# Patient Record
Sex: FEMALE | Race: BLACK OR AFRICAN AMERICAN | NOT HISPANIC OR LATINO | URBAN - METROPOLITAN AREA
[De-identification: names, ages, dates, MRNs, and addresses within clinical notes are randomized per-mention and may not be internally consistent; named-entity substitution may affect disease eponyms.]

---

## 2018-03-01 PROBLEM — E78.5 HYPERLIPIDEMIA: Status: ACTIVE | Noted: 2018-03-01

## 2018-03-25 PROBLEM — Z00.00 ROUTINE GENERAL MEDICAL EXAMINATION AT A HEALTH CARE FACILITY: Status: ACTIVE | Noted: 2018-03-25

## 2018-03-25 PROBLEM — N18.2 STAGE 2 CHRONIC KIDNEY DISEASE: Status: ACTIVE | Noted: 2018-03-25

## 2018-06-25 PROBLEM — Z00.00 ROUTINE GENERAL MEDICAL EXAMINATION AT A HEALTH CARE FACILITY: Status: RESOLVED | Noted: 2018-03-25 | Resolved: 2018-06-25

## 2018-07-18 PROBLEM — E55.9 VITAMIN D DEFICIENCY DISEASE: Status: ACTIVE | Noted: 2018-07-18

## 2018-07-18 PROBLEM — I10 ESSENTIAL HYPERTENSION, BENIGN: Status: ACTIVE | Noted: 2018-07-18

## 2018-08-05 PROBLEM — E87.6 HYPOKALEMIA: Status: ACTIVE | Noted: 2018-08-05

## 2019-03-13 PROBLEM — R80.8 OTHER PROTEINURIA: Status: ACTIVE | Noted: 2019-03-13

## 2019-07-22 PROBLEM — N95.9 MENOPAUSAL PROBLEM: Status: ACTIVE | Noted: 2019-07-22

## 2019-07-28 PROBLEM — M85.80 OSTEOPENIA: Status: ACTIVE | Noted: 2019-07-28

## 2020-08-03 PROBLEM — Z87.01 HISTORY OF PNEUMONIA: Status: ACTIVE | Noted: 2020-08-03

## 2020-12-03 ENCOUNTER — OFFICE VISIT (OUTPATIENT)
Dept: DERMATOLOGY | Facility: CLINIC | Age: 65
End: 2020-12-03
Payer: COMMERCIAL

## 2020-12-03 DIAGNOSIS — L13.9 BULLOUS DERMATOSIS: Primary | ICD-10-CM

## 2020-12-03 PROCEDURE — 99202 PR OFFICE/OUTPT VISIT, NEW, LEVL II, 15-29 MIN: ICD-10-PCS | Mod: S$GLB,,, | Performed by: STUDENT IN AN ORGANIZED HEALTH CARE EDUCATION/TRAINING PROGRAM

## 2020-12-03 PROCEDURE — 99999 PR PBB SHADOW E&M-EST. PATIENT-LVL III: CPT | Mod: PBBFAC,,, | Performed by: STUDENT IN AN ORGANIZED HEALTH CARE EDUCATION/TRAINING PROGRAM

## 2020-12-03 PROCEDURE — 99202 OFFICE O/P NEW SF 15 MIN: CPT | Mod: S$GLB,,, | Performed by: STUDENT IN AN ORGANIZED HEALTH CARE EDUCATION/TRAINING PROGRAM

## 2020-12-03 PROCEDURE — 99999 PR PBB SHADOW E&M-EST. PATIENT-LVL III: ICD-10-PCS | Mod: PBBFAC,,, | Performed by: STUDENT IN AN ORGANIZED HEALTH CARE EDUCATION/TRAINING PROGRAM

## 2020-12-03 NOTE — LETTER
December 3, 2020      Susanne Gimenez MD  [address] [facility] [address]           [facility]  [address] [facility] [address]  Phone: [phone]  Fax: [phone]          Patient: Aminah Dockery   MR Number: [id number]   YOB: 1955   Date of Visit: 12/3/2020       Dear Dr. Susanne Gimenez:    Thank you for referring Aminah Dockery to me for evaluation. Attached you will find relevant portions of my assessment and plan of care.    If you have questions, please do not hesitate to call me. I look forward to following Aminah Dockery along with you.    Sincerely,    Shahla Katz MD    Enclosure  CC:  No Recipients    If you would like to receive this communication electronically, please contact [email or web address] or [phone] to request more information on [employer] access.    For providers and/or their staff who would like to refer a patient to [facility], please contact us through our one-stop-shop provider referral line, [facility], at [phone].    If you feel you have received this communication in error or would no longer like to receive these types of communications, please e-mail [email or web address]

## 2020-12-03 NOTE — PROGRESS NOTES
Subjective:       Patient ID:  Aminah Dockery is a 65 y.o. female who presents for   Chief Complaint   Patient presents with    blisters     knee and leg, x 3weeks.     History of Present Illness: The patient presents with chief complaint of blisters .  Location: knee and leg   Duration: 3 weeks   Signs/Symptoms: none     Prior treatments: none     Patient endorses taking Aleve and goodies for pain. Denies any new blisters.      Review of Systems   Skin: Negative for itching and rash.   Hematologic/Lymphatic: Does not bruise/bleed easily.        Objective:    Physical Exam   Constitutional: She appears well-developed and well-nourished. No distress.   Neurological: She is alert and oriented to person, place, and time. She is not disoriented.   Psychiatric: She has a normal mood and affect.   Skin:   Areas Examined (abnormalities noted in diagram):   Head / Face Inspection Performed  Neck Inspection Performed  RUE Inspected  LUE Inspection Performed  RLE Inspected  LLE Inspection Performed              Diagram Legend     Erythematous scaling macule/papule c/w actinic keratosis       Vascular papule c/w angioma      Pigmented verrucoid papule/plaque c/w seborrheic keratosis      Yellow umbilicated papule c/w sebaceous hyperplasia      Irregularly shaped tan macule c/w lentigo     1-2 mm smooth white papules consistent with Milia      Movable subcutaneous cyst with punctum c/w epidermal inclusion cyst      Subcutaneous movable cyst c/w pilar cyst      Firm pink to brown papule c/w dermatofibroma      Pedunculated fleshy papule(s) c/w skin tag(s)      Evenly pigmented macule c/w junctional nevus     Mildly variegated pigmented, slightly irregular-bordered macule c/w mildly atypical nevus      Flesh colored to evenly pigmented papule c/w intradermal nevus       Pink pearly papule/plaque c/w basal cell carcinoma      Erythematous hyperkeratotic cursted plaque c/w SCC      Surgical scar with no sign of skin cancer  recurrence      Open and closed comedones      Inflammatory papules and pustules      Verrucoid papule consistent consistent with wart     Erythematous eczematous patches and plaques     Dystrophic onycholytic nail with subungual debris c/w onychomycosis     Umbilicated papule    Erythematous-base heme-crusted tan verrucoid plaque consistent with inflamed seborrheic keratosis     Erythematous Silvery Scaling Plaque c/w Psoriasis     See annotation      Assessment / Plan:        Bullous dermatosis  -     Ddx: bullous drug vs less likely bullous pemphigoid vs bullous arthropod  - No new lesions today  - Patient to call when she develops any new lesions. Will plan to biopsy for H&E and DIF.             Follow up if symptoms worsen or fail to improve.

## 2021-03-09 ENCOUNTER — HOSPITAL ENCOUNTER (EMERGENCY)
Facility: HOSPITAL | Age: 66
Discharge: HOME | End: 2021-03-09
Attending: EMERGENCY MEDICINE
Payer: COMMERCIAL

## 2021-03-09 VITALS
RESPIRATION RATE: 20 BRPM | DIASTOLIC BLOOD PRESSURE: 86 MMHG | HEART RATE: 69 BPM | SYSTOLIC BLOOD PRESSURE: 141 MMHG | WEIGHT: 170.63 LBS | OXYGEN SATURATION: 98 % | TEMPERATURE: 99 F | HEIGHT: 60 IN | BODY MASS INDEX: 33.5 KG/M2

## 2021-03-09 DIAGNOSIS — A60.09 HERPES GENITALIS IN WOMEN: Primary | ICD-10-CM

## 2021-03-09 DIAGNOSIS — R03.0 ELEVATED BLOOD PRESSURE READING: ICD-10-CM

## 2021-03-09 PROCEDURE — 99283 EMERGENCY DEPT VISIT LOW MDM: CPT | Mod: ER

## 2021-03-09 PROCEDURE — 86803 HEPATITIS C AB TEST: CPT | Performed by: EMERGENCY MEDICINE

## 2021-03-09 PROCEDURE — 87529 HSV DNA AMP PROBE: CPT | Performed by: PHYSICIAN ASSISTANT

## 2021-03-09 PROCEDURE — 86703 HIV-1/HIV-2 1 RESULT ANTBDY: CPT | Performed by: EMERGENCY MEDICINE

## 2021-03-09 RX ORDER — NITROFURANTOIN 25; 75 MG/1; MG/1
100 CAPSULE ORAL
COMMUNITY
End: 2022-08-12

## 2021-03-09 RX ORDER — VALACYCLOVIR HYDROCHLORIDE 1 G/1
1000 TABLET, FILM COATED ORAL EVERY 12 HOURS
Qty: 14 TABLET | Refills: 0 | Status: SHIPPED | OUTPATIENT
Start: 2021-03-09 | End: 2021-03-16

## 2021-03-10 LAB
HCV AB SERPL QL IA: NEGATIVE
HIV 1+2 AB+HIV1 P24 AG SERPL QL IA: NEGATIVE

## 2021-03-11 LAB
HSV1 DNA SPEC QL NAA+PROBE: NEGATIVE
HSV2 DNA SPEC QL NAA+PROBE: POSITIVE
SPECIMEN SOURCE: ABNORMAL

## 2021-03-12 ENCOUNTER — TELEPHONE (OUTPATIENT)
Dept: EMERGENCY MEDICINE | Facility: HOSPITAL | Age: 66
End: 2021-03-12

## 2021-03-29 ENCOUNTER — TELEPHONE (OUTPATIENT)
Dept: NEPHROLOGY | Facility: CLINIC | Age: 66
End: 2021-03-29

## 2022-08-12 PROBLEM — R06.2 WHEEZING: Status: ACTIVE | Noted: 2022-08-12

## 2023-04-21 PROBLEM — D50.9 IRON DEFICIENCY ANEMIA: Status: ACTIVE | Noted: 2023-04-21

## 2023-04-21 PROBLEM — E78.00 PURE HYPERCHOLESTEROLEMIA: Status: ACTIVE | Noted: 2023-04-21

## 2023-04-24 PROBLEM — J20.8 ACUTE BRONCHITIS DUE TO OTHER SPECIFIED ORGANISMS: Status: ACTIVE | Noted: 2023-04-24

## 2023-04-24 PROBLEM — J40 BRONCHITIS: Status: ACTIVE | Noted: 2023-04-24

## 2023-04-24 PROBLEM — J20.8 ACUTE BRONCHITIS DUE TO OTHER SPECIFIED ORGANISMS: Status: RESOLVED | Noted: 2023-04-24 | Resolved: 2023-04-24

## 2023-06-08 ENCOUNTER — PATIENT MESSAGE (OUTPATIENT)
Dept: PULMONOLOGY | Facility: CLINIC | Age: 68
End: 2023-06-08
Payer: COMMERCIAL

## 2023-06-08 PROBLEM — J01.00 ACUTE NON-RECURRENT MAXILLARY SINUSITIS: Status: ACTIVE | Noted: 2023-06-08

## 2023-06-12 ENCOUNTER — HOSPITAL ENCOUNTER (OUTPATIENT)
Dept: RADIOLOGY | Facility: HOSPITAL | Age: 68
Discharge: HOME | End: 2023-06-12
Attending: INTERNAL MEDICINE
Payer: COMMERCIAL

## 2023-06-12 ENCOUNTER — OFFICE VISIT (OUTPATIENT)
Dept: PULMONOLOGY | Facility: CLINIC | Age: 68
End: 2023-06-12
Payer: COMMERCIAL

## 2023-06-12 VITALS
WEIGHT: 169.31 LBS | OXYGEN SATURATION: 95 % | RESPIRATION RATE: 18 BRPM | SYSTOLIC BLOOD PRESSURE: 148 MMHG | BODY MASS INDEX: 33.24 KG/M2 | DIASTOLIC BLOOD PRESSURE: 98 MMHG | HEART RATE: 89 BPM | HEIGHT: 60 IN

## 2023-06-12 DIAGNOSIS — I10 ESSENTIAL HYPERTENSION, BENIGN: ICD-10-CM

## 2023-06-12 DIAGNOSIS — J40 BRONCHITIS: ICD-10-CM

## 2023-06-12 DIAGNOSIS — E78.2 MIXED HYPERLIPIDEMIA: ICD-10-CM

## 2023-06-12 DIAGNOSIS — J45.20 MILD INTERMITTENT ASTHMATIC BRONCHITIS WITHOUT COMPLICATION: Primary | ICD-10-CM

## 2023-06-12 DIAGNOSIS — R06.2 WHEEZING: ICD-10-CM

## 2023-06-12 PROBLEM — J45.909 ASTHMATIC BRONCHITIS: Status: ACTIVE | Noted: 2023-04-24

## 2023-06-12 PROCEDURE — 99999 PR PBB SHADOW E&M-EST. PATIENT-LVL V: CPT | Mod: PBBFAC,,, | Performed by: INTERNAL MEDICINE

## 2023-06-12 PROCEDURE — 71046 X-RAY EXAM CHEST 2 VIEWS: CPT | Mod: 26,,, | Performed by: RADIOLOGY

## 2023-06-12 PROCEDURE — 99999 PR PBB SHADOW E&M-EST. PATIENT-LVL V: ICD-10-PCS | Mod: PBBFAC,,, | Performed by: INTERNAL MEDICINE

## 2023-06-12 PROCEDURE — 71046 XR CHEST PA AND LATERAL: ICD-10-PCS | Mod: 26,,, | Performed by: RADIOLOGY

## 2023-06-12 PROCEDURE — 71046 X-RAY EXAM CHEST 2 VIEWS: CPT | Mod: TC,FY,PO

## 2023-06-12 PROCEDURE — 99205 PR OFFICE/OUTPT VISIT, NEW, LEVL V, 60-74 MIN: ICD-10-PCS | Mod: S$GLB,,, | Performed by: INTERNAL MEDICINE

## 2023-06-12 PROCEDURE — 99205 OFFICE O/P NEW HI 60 MIN: CPT | Mod: S$GLB,,, | Performed by: INTERNAL MEDICINE

## 2023-06-12 RX ORDER — IBUPROFEN 800 MG/1
TABLET ORAL
COMMUNITY
Start: 2023-05-17

## 2023-06-12 NOTE — PROGRESS NOTES
Pulmonary Outpatient  Visit     Subjective:       Patient ID: Aminah Dockery is a 68 y.o. female.    Social History     Tobacco Use   Smoking Status Never   Smokeless Tobacco Never            Chief Complaint: Asthma and Wheezing      Aminah Dockery is 68 y.o.  Referred by Susanne Gimenez MD   Hx COVID x 3  Recently Bronchitis: CXR : April 2023  Cough syrup, Abx,   Walk with SOB, [facility] ER, CXR, EKG  Prednisone and Abx  May 12th: went to ER: Steriods given  Felt better  Wheezing improved  PCP gave: prednisone and abx;Levaquin  Robyn fever  Works at [employer]  Has Inhaler, tessalon, Nebulizer with Budesonide.  Brother Had asthma    CXR reviewed: normal            Review of Systems   HENT:  Positive for postnasal drip.    Respiratory:  Positive for cough and wheezing.    All other systems reviewed and are negative.    Outpatient Encounter Medications as of 6/12/2023   Medication Sig Dispense Refill    albuterol (PROVENTIL/VENTOLIN HFA) 90 mcg/actuation inhaler INHALE 2 PUFFS INTO THE LUNGS EVERY 4 HOURS AS NEEDED FOR WHEEZING OR RESCUE 6.7 g 5    amLODIPine (NORVASC) 10 MG tablet Take 1 tablet (10 mg total) by mouth once daily. 30 tablet 5    atorvastatin (LIPITOR) 80 MG tablet Take 1 tablet (80 mg total) by mouth once daily. 30 tablet 5    benzonatate (TESSALON) 200 MG capsule Take 200 capsules by mouth every 8 (eight) hours as needed.      budesonide 1 mg/2 mL NbSp Inhale 1 mg into the lungs once daily. 60 mL 2    chlorhexidine (PERIDEX) 0.12 % solution PLEASE SEE ATTACHED FOR DETAILED DIRECTIONS      cholecalciferol, vitamin D3, 1,250 mcg (50,000 unit) capsule TAKE 1 CAPSULE BY MOUTH ONE TIME PER WEEK 5 capsule 5    doxazosin (CARDURA) 4 MG tablet Take 1 tablet (4 mg total) by mouth every evening. 30 tablet 5    fluconazole (DIFLUCAN) 150 MG Tab Take one tab with onset of yeast ; repeat in 48 hours if stil with sxs 2 tablet 0    hydrocodone-acetaminophen (HYCET)  solution 7.5-325 mg/15mL TAKE 5ML'S BY MOUTH EVERY 6 HOURS AS NEEDED FOR PAIN OR COUGH      ibuprofen (ADVIL,MOTRIN) 800 MG tablet TAKE 1 TABLET BY MOUTH EVERY 6 TO 8 HOURS WITH FOOD AS NEEDED FOR PAIN (MAX 4 TABS/DAY)      levoFLOXacin (LEVAQUIN) 500 MG tablet Take 1 tablet (500 mg total) by mouth once daily. for 10 days 10 tablet 0    lisinopriL (PRINIVIL,ZESTRIL) 40 MG tablet Take 1 tablet (40 mg total) by mouth 2 (two) times daily. 60 tablet 5    metoprolol succinate (TOPROL-XL) 100 MG 24 hr tablet TAKE 1 AND 1/2 TABLETS BY MOUTH ONCE DAILY 45 tablet 5    predniSONE (DELTASONE) 20 MG tablet Take two tabs poqd x 5 days (Patient not taking: Reported on 6/12/2023) 10 tablet 0     No facility-administered encounter medications on file as of 6/12/2023.       The following portions of the patient's history were reviewed and updated as appropriate: She  has a past medical history of Disorder of kidney and ureter, Hyperlipidemia, Hypertension, Routine general medical examination at a health care facility (07/14/2017), and Vitamin D deficiency.  She does not have any pertinent problems on file.  She  has a past surgical history that includes Hysterectomy.  Her family history includes Cancer in her mother; Hypertension in her father; Stroke in her father.  She  reports that she has never smoked. She has never used smokeless tobacco. She reports current alcohol use. She reports that she does not use drugs.  She has a current medication list which includes the following prescription(s): albuterol, amlodipine, atorvastatin, benzonatate, budesonide, chlorhexidine, cholecalciferol (vitamin d3), doxazosin, fluconazole, hydrocodone-apap 7.5-325 mg/15 ml, ibuprofen, levofloxacin, lisinopril, metoprolol succinate, and prednisone.  Current Outpatient Medications on File Prior to Visit   Medication Sig Dispense Refill    albuterol (PROVENTIL/VENTOLIN HFA) 90 mcg/actuation inhaler INHALE 2 PUFFS INTO THE LUNGS EVERY 4 HOURS AS NEEDED  FOR WHEEZING OR RESCUE 6.7 g 5    amLODIPine (NORVASC) 10 MG tablet Take 1 tablet (10 mg total) by mouth once daily. 30 tablet 5    atorvastatin (LIPITOR) 80 MG tablet Take 1 tablet (80 mg total) by mouth once daily. 30 tablet 5    benzonatate (TESSALON) 200 MG capsule Take 200 capsules by mouth every 8 (eight) hours as needed.      budesonide 1 mg/2 mL NbSp Inhale 1 mg into the lungs once daily. 60 mL 2    chlorhexidine (PERIDEX) 0.12 % solution PLEASE SEE ATTACHED FOR DETAILED DIRECTIONS      cholecalciferol, vitamin D3, 1,250 mcg (50,000 unit) capsule TAKE 1 CAPSULE BY MOUTH ONE TIME PER WEEK 5 capsule 5    doxazosin (CARDURA) 4 MG tablet Take 1 tablet (4 mg total) by mouth every evening. 30 tablet 5    fluconazole (DIFLUCAN) 150 MG Tab Take one tab with onset of yeast ; repeat in 48 hours if stil with sxs 2 tablet 0    hydrocodone-acetaminophen (HYCET) solution 7.5-325 mg/15mL TAKE 5ML'S BY MOUTH EVERY 6 HOURS AS NEEDED FOR PAIN OR COUGH      ibuprofen (ADVIL,MOTRIN) 800 MG tablet TAKE 1 TABLET BY MOUTH EVERY 6 TO 8 HOURS WITH FOOD AS NEEDED FOR PAIN (MAX 4 TABS/DAY)      levoFLOXacin (LEVAQUIN) 500 MG tablet Take 1 tablet (500 mg total) by mouth once daily. for 10 days 10 tablet 0    lisinopriL (PRINIVIL,ZESTRIL) 40 MG tablet Take 1 tablet (40 mg total) by mouth 2 (two) times daily. 60 tablet 5    metoprolol succinate (TOPROL-XL) 100 MG 24 hr tablet TAKE 1 AND 1/2 TABLETS BY MOUTH ONCE DAILY 45 tablet 5    predniSONE (DELTASONE) 20 MG tablet Take two tabs poqd x 5 days (Patient not taking: Reported on 6/12/2023) 10 tablet 0     No current facility-administered medications on file prior to visit.     She has No Known Allergies..      BP Readings from Last 3 Encounters:   06/12/23 (!) 148/98   04/24/23 110/80   02/23/23 130/88        MMRC Dyspnea Scale (4 is worst)     [x] MMRC 0: Dyspneic on strenuous excercise (0 points)    [] MMRC 1: Dyspneic on walking a slight hill (0 points)    [] MMRC 2: Dyspneic on  walking level ground; must stop occasionally due to breathlessness (1 point)    [] MMRC 3: Must stop for breathlessness after walking 100 yards or after a few minutes (2 points)    [] MMRC 4: Cannot leave house; breathless on dressing/undressing (3 points)       Asthma Control Test  In the past 4  weeks, how much of the time did your asthma keep you from getting as much done at work, school or at home?: Some of the time  During the past 4 weeks, how often have you had shortness of breath?: Once a day  During the past 4 weeks, how often did your asthma symptoms (wheezing, couging, shortness of breath, chest tightness or pain) wake you up at night or earlier than usual in the morning?: 2 or 3 nights a week  During the past 4 weeks, how often have you used your rescue inhaler or nebulizer medication (such as albuterol)?: 3 or more times per day  How would you rate your asthma control during the past 4 weeks?: Somewhat controlled  If your score is 19 or less, your asthma may not be under control: 11             No flowsheet data found.              Objective:     Vital Signs (Most Recent)  Vital Signs  Pulse: 89  Resp: 18  SpO2: 95 %  BP: (!) 148/98  Height and Weight  Height: 5' (152.4 cm)  Weight: 76.8 kg (169 lb 5 oz)  BSA (Calculated - sq m): 1.8 sq meters  BMI (Calculated): 33.1  Weight in (lb) to have BMI = 25: 127.7]  Wt Readings from Last 2 Encounters:   06/12/23 76.8 kg (169 lb 5 oz)   04/24/23 77.7 kg (171 lb 6.4 oz)       Physical Exam  Vitals and nursing note reviewed.   Constitutional:       Appearance: She is normal weight.   HENT:      Head: Normocephalic and atraumatic.      Nose: Nose normal.   Eyes:      Pupils: Pupils are equal, round, and reactive to light.   Cardiovascular:      Rate and Rhythm: Normal rate and regular rhythm.      Pulses: Normal pulses.      Heart sounds: Normal heart sounds.   Pulmonary:      Effort: Pulmonary effort is normal.      Breath sounds: Normal breath sounds.    Abdominal:      General: Bowel sounds are normal.      Palpations: Abdomen is soft.   Musculoskeletal:      Cervical back: Normal range of motion.   Skin:     General: Skin is warm.   Neurological:      General: No focal deficit present.      Mental Status: She is alert and oriented to person, place, and time.   Psychiatric:         Mood and Affect: Mood normal.        Laboratory  Lab Results   Component Value Date    WBC 7.4 02/23/2023    RBC 4.00 02/23/2023    HGB 11.7 02/23/2023    HCT 35.8 02/23/2023    MCV 90 02/23/2023    MCH 29.3 02/23/2023    MCHC 32.7 02/23/2023    RDW 13.5 02/23/2023     02/23/2023    LYMPH 1.4 02/23/2023    MONO 9 02/23/2023    MONO 0.6 02/23/2023    EOS 0.1 02/23/2023    BASO 0.0 02/23/2023    EOSINOPHIL 2 02/23/2023    BASOPHIL 0 02/23/2023       BMP  Lab Results   Component Value Date     02/23/2023    K 4.1 02/23/2023     02/23/2023    CO2 30 (H) 02/23/2023    BUN 20 02/23/2023    CREATININE 1.58 (H) 02/23/2023    CALCIUM 9.2 02/23/2023    EGFRNONAA 37 (L) 01/06/2022    AST 19 02/23/2023    ALT 15 02/23/2023    PROT 7.2 02/19/2020          No results found for: IGE     No results found for: ASPERGILLUS  No results found for: AFUMIGATUSCL     No results found for: ACE     Diagnostic Results:  I have personally reviewed today the following studies:    X-Ray Chest PA And Lateral  Narrative: EXAM: XR CHEST PA AND LATERAL    CLINICAL HISTORY:  Bronchitis.    TECHNIQUE: 2 views of the chest.    COMPARISON: 05/12/2023 x-ray from outside institution.    FINDINGS:  Heart size is normal.  Mild aortic atherosclerosis is present with elongation of the descending thoracic aorta.  Similar to the previous study.    The lung fields are clear with no infiltrate.  No pleural effusion.  Impression:  Stable negative two-view chest x-ray.  No acute findings.    Finalized on: 6/12/2023 2:49 PM By:  Daniel Booth MD  [facility]# [id number]      2023-06-12 14:51:[id number]    APURVA        Assessment/Plan:     Problem List Items Addressed This Visit       Hyperlipidemia    Essential hypertension, benign    Wheezing    Relevant Orders    X-Ray Chest PA And Lateral (Completed)    Asthmatic bronchitis - Primary    Relevant Orders    Fraction of  Nitric Oxide    Spirometry with/without bronchodilator    IgE        Continue Current therapy  KJ and Budesonide  Adherence with Flonase     Follow up in about 4 weeks (around 7/10/2023), or Labs, CXR today, FeNo, Weed, PCV 13,.    This note was prepared using voice recognition system and is likely to have sound alike errors that may have been overlooked even after proof reading.  Please call me with any questions    Discussed diagnosis, its evaluation, treatment and usual course. All questions answered.    Thank you for the courtesy of participating in the care of this patient    Edwar Mathews MD      Personal Diagnostic Review  [x]  CXR    []  ECHO    []  ONSAT    []  6MWD    []  LABS    []  CHEST CT    []  PET CT    []  Biopsy results

## 2023-07-07 ENCOUNTER — CLINICAL SUPPORT (OUTPATIENT)
Dept: PULMONOLOGY | Facility: CLINIC | Age: 68
End: 2023-07-07
Payer: COMMERCIAL

## 2023-07-07 ENCOUNTER — OFFICE VISIT (OUTPATIENT)
Dept: PULMONOLOGY | Facility: CLINIC | Age: 68
End: 2023-07-07
Payer: COMMERCIAL

## 2023-07-07 VITALS
SYSTOLIC BLOOD PRESSURE: 120 MMHG | HEIGHT: 60 IN | WEIGHT: 175.69 LBS | HEART RATE: 64 BPM | BODY MASS INDEX: 34.49 KG/M2 | BODY MASS INDEX: 34.49 KG/M2 | HEIGHT: 60 IN | DIASTOLIC BLOOD PRESSURE: 82 MMHG | OXYGEN SATURATION: 95 % | RESPIRATION RATE: 17 BRPM | WEIGHT: 175.69 LBS

## 2023-07-07 DIAGNOSIS — J45.50 SEVERE PERSISTENT ASTHMA WITHOUT COMPLICATION: ICD-10-CM

## 2023-07-07 DIAGNOSIS — J45.50 SEVERE PERSISTENT ASTHMA WITHOUT COMPLICATION: Primary | ICD-10-CM

## 2023-07-07 DIAGNOSIS — J45.20 MILD INTERMITTENT ASTHMATIC BRONCHITIS WITHOUT COMPLICATION: ICD-10-CM

## 2023-07-07 DIAGNOSIS — I10 ESSENTIAL HYPERTENSION, BENIGN: ICD-10-CM

## 2023-07-07 DIAGNOSIS — E78.2 MIXED HYPERLIPIDEMIA: ICD-10-CM

## 2023-07-07 LAB
BRPFT: NORMAL
FEF 25 75 CHG: 9.1 %
FEF 25 75 LLN: 0.63
FEF 25 75 POST REF: 37.2 %
FEF 25 75 PRE REF: 34.1 %
FEF 25 75 REF: 1.59
FET100 CHG: 5.3 %
FEV1 CHG: 3.4 %
FEV1 FVC CHG: 2.2 %
FEV1 FVC LLN: 67
FEV1 FVC POST REF: 86.7 %
FEV1 FVC PRE REF: 84.8 %
FEV1 FVC REF: 79
FEV1 LLN: 1.21
FEV1 POST REF: 56.5 %
FEV1 PRE REF: 54.6 %
FEV1 REF: 1.71
FVC CHG: 1.2 %
FVC LLN: 1.55
FVC POST REF: 65 %
FVC PRE REF: 64.2 %
FVC REF: 2.16
PEF CHG: -9.8 %
PEF LLN: 2.68
PEF POST REF: 55 %
PEF PRE REF: 61 %
PEF REF: 4.44
POST FEF 25 75: 0.59 L/S
POST FET 100: 7.8 SEC
POST FEV1 FVC: 68.87 %
POST FEV1: 0.97 L
POST FVC: 1.4 L
POST PEF: 2.44 L/S
PRE FEF 25 75: 0.54 L/S
PRE FET 100: 7.41 SEC
PRE FEV1 FVC: 67.36 %
PRE FEV1: 0.94 L
PRE FVC: 1.39 L
PRE PEF: 2.71 L/S

## 2023-07-07 PROCEDURE — 99214 PR OFFICE/OUTPT VISIT, EST, LEVL IV, 30-39 MIN: ICD-10-PCS | Mod: 25,S$GLB,, | Performed by: INTERNAL MEDICINE

## 2023-07-07 PROCEDURE — 99999 PR PBB SHADOW E&M-EST. PATIENT-LVL V: CPT | Mod: PBBFAC,,, | Performed by: INTERNAL MEDICINE

## 2023-07-07 PROCEDURE — 94618 PULMONARY STRESS TESTING: CPT | Mod: S$GLB,,, | Performed by: INTERNAL MEDICINE

## 2023-07-07 PROCEDURE — 99999 PR PBB SHADOW E&M-EST. PATIENT-LVL I: CPT | Mod: PBBFAC,,,

## 2023-07-07 PROCEDURE — 99999 PR PBB SHADOW E&M-EST. PATIENT-LVL II: ICD-10-PCS | Mod: PBBFAC,,,

## 2023-07-07 PROCEDURE — 99999 PR PBB SHADOW E&M-EST. PATIENT-LVL I: ICD-10-PCS | Mod: PBBFAC,,,

## 2023-07-07 PROCEDURE — 94060 EVALUATION OF WHEEZING: CPT | Mod: S$GLB,,, | Performed by: INTERNAL MEDICINE

## 2023-07-07 PROCEDURE — 94060 PR EVAL OF BRONCHOSPASM: ICD-10-PCS | Mod: S$GLB,,, | Performed by: INTERNAL MEDICINE

## 2023-07-07 PROCEDURE — 99214 OFFICE O/P EST MOD 30 MIN: CPT | Mod: 25,S$GLB,, | Performed by: INTERNAL MEDICINE

## 2023-07-07 PROCEDURE — 99999 PR PBB SHADOW E&M-EST. PATIENT-LVL II: CPT | Mod: PBBFAC,,,

## 2023-07-07 PROCEDURE — 99999 PR PBB SHADOW E&M-EST. PATIENT-LVL V: ICD-10-PCS | Mod: PBBFAC,,, | Performed by: INTERNAL MEDICINE

## 2023-07-07 PROCEDURE — 94618 PULMONARY STRESS TESTING: ICD-10-PCS | Mod: S$GLB,,, | Performed by: INTERNAL MEDICINE

## 2023-07-07 NOTE — PROCEDURES
[facility]-Pulmonary Function 3rdFl  Six Minute Walk   SUMMARY   Ordering Provider: Edwar Mathews MD   Interpreting Provider: Edwar Mathews MD  Performing nurse/tech/RT: VT, RT  Diagnosis:  (Severe persistent asthma without complication)  Height: 5' (152.4 cm)  Weight: 79.7 kg (175 lb 11.3 oz)  BMI (Calculated): 34.3   Patient Race:    Phase Oxygen Assessment Supplemental O2 Heart   Rate Blood Pressure Viky Dyspnea Scale Rating   Resting 96 % Room Air 58 bpm 127/65 0   Exercise        Minute        1 94 % Room Air 76 bpm     2 95 % Room Air 81 bpm     3 95 %   87 bpm     4 95 % Room Air 93 bpm     5 96 % Room Air 94 bpm     6  96 % Room Air 92 bpm 137/72 0   Recovery        Minute        1 98 % Room Air 67 bpm     2 98 % Room Air 68 bpm     3 98 % Room Air 66 bpm     4 98 % Room Air 64 bpm 132/74 0     Six Minute Walk Summary     Patient Reported: Other (Comment) (Knee ache)     Interpretation:  Did the patient stop or pause?: No            Total Time Walked (Calculated): 360 seconds  Final Partial Lap Distance (feet): 125 feet  Total Distance Meters (Calculated): 403.86 meters  Predicted Distance Meters (Calculated): 418.79 meters  Percentage of Predicted (Calculated): 96.43  Peak VO2 (Calculated): 16.1  Mets: 4.6  Has The Patient Had a Previous Six Minute Walk Test?: No       Previous 6MWT Results  Has The Patient Had a Previous Six Minute Walk Test?: No      Six minute walk distance is 403.86m /418.79 meters (96.43 % predicted) with no.Patient did complete the study, walking 360 seconds of the 360 second test . During exercise, there was no  significant desaturation while breathing room air .Lowest oxygen saturation was 94% .Maximum heart rate during exercise was 94 bpm which is 61 % of maximum predicted heart rate of 153 bpm. Blood pressure remained stable and Heart rate remained stable Based upon age and body mass index, exercise capacity is normal.  Peak VO2 during walking was 16.1  ml/kg/min which is 37 % of predicted Peak VO2 max of 43 ml/kg/min based on a resting heart rate of 58/min.    Patient has not had a previous study. No previous study performed.

## 2023-07-07 NOTE — PROCEDURES
Clinical Guide to Interpretation or FeNO Levels :    FeNO  (ppb) LOW INTERMEDIATE HIGH   ADULT VALUES < 25 25-50          > 50   Th2-driven Inflammation Unlikely Likely Significant     Patients FeNO level at this visit : 12 (ppb)    Interpretation of FeNO measurement in adults:  [x] FENO is less than 25 ppb implies non eosinophilic airway inflammation or the absence of airway inflammation.    Comment: Low FENO (<25 ppb) in adult asthmatics with persistent symptoms suggests other etiologies for these symptoms and a lower likelihood of benefit from adding or increasing inhaled glucocorticoids.    [] FENO between 25 and 50 ppb in adults should be interpreted cautiously with reference to the clinical situation (eg, symptomatic, on or off therapy, current smoking).    [] FENO greater than 50 ppb in adults  suggests eosinophilic airway inflammation   Comment: High FENO (>50 ppb) in adult asthmatics even with atypical symptoms suggests glucocorticoid responsiveness. High FENO (>50 ppb) can help identify poor adherence or uncontrolled inflammation in asthma patients with otherwise seemingly controlled asthma.    Discussion:  A FENO less than 25 ppb in adults and less than 20 ppb in children younger than 12 years of age implies noneosinophilic airway inflammation or the absence of airway inflammation.  A FENO greater than 50 ppb in adults or greater than 35 ppb in children suggests eosinophilic airway inflammation.   Values of FENO between 25 and 50 ppb in adults (20 to 35 ppb in children) should be interpreted cautiously with reference to the clinical situation (eg, symptomatic, on or off therapy, current smoking).  A rising FENO with a greater than 20 percent change and more than 25 ppb (20 ppb in children) from a previously stable level suggests increasing eosinophilic airway inflammation, but there are wide inter-individual differences.  A decrease in FENO greater than 20 percent for values over 50 ppb or more than 10  ppb for values less than 50 ppb may be clinically important.  ?FENO in other respiratory diseases - Several other diseases are associated with altered levels of exhaled NO: low levels of FENO have been noted in cystic fibrosis, current smoking, pulmonary hypertension, hypothermia, primary ciliary dyskinesia, and bronchopulmonary dysplasia. Elevated FENO has been noted in atopy, nonasthmatic eosinophilic bronchitis, COPD exacerbations, noncystic fibrosis bronchiectasis, and viral upper respiratory infections.    REFERENCE:  ATS Board of Directors, December 2004, and by the ERS Executive Committee, June 2004. ATS/ERS Recommendations for Standardized Procedures for the Online and Offline Measurement of Exhaled Lower Respiratory Nitric Oxide and Nasal Nitric Oxide. Guideline 2005

## 2023-07-07 NOTE — PROGRESS NOTES
Pulmonary Outpatient  Visit     Subjective:       Patient ID: Aminah Dockery is a 68 y.o. female.    Social History     Tobacco Use   Smoking Status Never   Smokeless Tobacco Never            Chief Complaint: Asthma      Aminah Dockery is 68 y.o.  Referred by Susanne Gimenez MD   Hx COVID x 3  Recently Bronchitis: CXR : April 2023  Cough syrup, Abx,   Walk with SOB, [facility] ER, CXR, EKG  Prednisone and Abx  May 12th: went to ER: Steriods given  Felt better  Wheezing improved  PCP gave: prednisone and abx;Levaquin  Denies fever  Works at [employer]  Has Inhaler, tessalon, Nebulizer with Budesonide.  Brother Had asthma    CXR reviewed: normal    07/7/2023  Followup to review tests  Feels much better  FeNo12  Cough, SOB resolved  However FEV1 54.6%  Added TRELEGY  Reduced FVC and FEV1 raise concern for ILD  Hx COVID and 2nd hand tobacco exposure  6MWD: normal capacity: no desturation            Review of Systems   HENT:  Negative for postnasal drip.    Respiratory:  Negative for snoring and cough.    All other systems reviewed and are negative.    Outpatient Encounter Medications as of 7/7/2023   Medication Sig Dispense Refill    albuterol (PROVENTIL/VENTOLIN HFA) 90 mcg/actuation inhaler INHALE 2 PUFFS INTO THE LUNGS EVERY 4 HOURS AS NEEDED FOR WHEEZING OR RESCUE 6.7 g 5    amLODIPine (NORVASC) 10 MG tablet Take 1 tablet (10 mg total) by mouth once daily. 30 tablet 5    atorvastatin (LIPITOR) 80 MG tablet Take 1 tablet (80 mg total) by mouth once daily. 30 tablet 5    benzonatate (TESSALON) 200 MG capsule Take 200 capsules by mouth every 8 (eight) hours as needed.      budesonide 1 mg/2 mL NbSp Inhale 1 mg into the lungs once daily. 60 mL 2    chlorhexidine (PERIDEX) 0.12 % solution PLEASE SEE ATTACHED FOR DETAILED DIRECTIONS      cholecalciferol, vitamin D3, 1,250 mcg (50,000 unit) capsule TAKE 1 CAPSULE BY MOUTH ONE TIME PER WEEK 5 capsule 5    doxazosin (CARDURA) 4  MG tablet Take 1 tablet (4 mg total) by mouth every evening. 30 tablet 5    fluconazole (DIFLUCAN) 150 MG Tab Take one tab with onset of yeast ; repeat in 48 hours if stil with sxs 2 tablet 0    fluticasone-umeclidin-vilanter (TRELEGY ELLIPTA) 100-62.5-25 mcg DsDv Inhale 1 puff into the lungs once daily. 60 each 11    hydrocodone-acetaminophen (HYCET) solution 7.5-325 mg/15mL TAKE 5ML'S BY MOUTH EVERY 6 HOURS AS NEEDED FOR PAIN OR COUGH      ibuprofen (ADVIL,MOTRIN) 800 MG tablet TAKE 1 TABLET BY MOUTH EVERY 6 TO 8 HOURS WITH FOOD AS NEEDED FOR PAIN (MAX 4 TABS/DAY)      [] levoFLOXacin (LEVAQUIN) 500 MG tablet Take 1 tablet (500 mg total) by mouth once daily. for 10 days 10 tablet 0    lisinopriL (PRINIVIL,ZESTRIL) 40 MG tablet Take 1 tablet (40 mg total) by mouth 2 (two) times daily. 60 tablet 5    metoprolol succinate (TOPROL-XL) 100 MG 24 hr tablet TAKE 1 AND 1/2 TABLETS BY MOUTH ONCE DAILY 45 tablet 5    predniSONE (DELTASONE) 20 MG tablet Take two tabs poqd x 5 days (Patient not taking: Reported on 2023) 10 tablet 0     No facility-administered encounter medications on file as of 2023.       The following portions of the patient's history were reviewed and updated as appropriate: She  has a past medical history of Disorder of kidney and ureter, Hyperlipidemia, Hypertension, Routine general medical examination at a health care facility (2017), and Vitamin D deficiency.  She does not have any pertinent problems on file.  She  has a past surgical history that includes Hysterectomy.  Her family history includes Cancer in her mother; Hypertension in her father; Stroke in her father.  She  reports that she has never smoked. She has never used smokeless tobacco. She reports current alcohol use. She reports that she does not use drugs.  She has a current medication list which includes the following prescription(s): albuterol, amlodipine, atorvastatin, benzonatate, budesonide, chlorhexidine,  cholecalciferol (vitamin d3), doxazosin, fluconazole, fluticasone-umeclidin-vilanter, hydrocodone-apap 7.5-325 mg/15 ml, ibuprofen, lisinopril, metoprolol succinate, and prednisone.  Current Outpatient Medications on File Prior to Visit   Medication Sig Dispense Refill    albuterol (PROVENTIL/VENTOLIN HFA) 90 mcg/actuation inhaler INHALE 2 PUFFS INTO THE LUNGS EVERY 4 HOURS AS NEEDED FOR WHEEZING OR RESCUE 6.7 g 5    amLODIPine (NORVASC) 10 MG tablet Take 1 tablet (10 mg total) by mouth once daily. 30 tablet 5    atorvastatin (LIPITOR) 80 MG tablet Take 1 tablet (80 mg total) by mouth once daily. 30 tablet 5    benzonatate (TESSALON) 200 MG capsule Take 200 capsules by mouth every 8 (eight) hours as needed.      budesonide 1 mg/2 mL NbSp Inhale 1 mg into the lungs once daily. 60 mL 2    chlorhexidine (PERIDEX) 0.12 % solution PLEASE SEE ATTACHED FOR DETAILED DIRECTIONS      cholecalciferol, vitamin D3, 1,250 mcg (50,000 unit) capsule TAKE 1 CAPSULE BY MOUTH ONE TIME PER WEEK 5 capsule 5    doxazosin (CARDURA) 4 MG tablet Take 1 tablet (4 mg total) by mouth every evening. 30 tablet 5    fluconazole (DIFLUCAN) 150 MG Tab Take one tab with onset of yeast ; repeat in 48 hours if stil with sxs 2 tablet 0    hydrocodone-acetaminophen (HYCET) solution 7.5-325 mg/15mL TAKE 5ML'S BY MOUTH EVERY 6 HOURS AS NEEDED FOR PAIN OR COUGH      ibuprofen (ADVIL,MOTRIN) 800 MG tablet TAKE 1 TABLET BY MOUTH EVERY 6 TO 8 HOURS WITH FOOD AS NEEDED FOR PAIN (MAX 4 TABS/DAY)      lisinopriL (PRINIVIL,ZESTRIL) 40 MG tablet Take 1 tablet (40 mg total) by mouth 2 (two) times daily. 60 tablet 5    metoprolol succinate (TOPROL-XL) 100 MG 24 hr tablet TAKE 1 AND 1/2 TABLETS BY MOUTH ONCE DAILY 45 tablet 5    predniSONE (DELTASONE) 20 MG tablet Take two tabs poqd x 5 days (Patient not taking: Reported on 6/12/2023) 10 tablet 0     No current facility-administered medications on file prior to visit.     She has No Known Allergies..      BP Readings  from Last 3 Encounters:   07/07/23 120/82   06/12/23 (!) 148/98   04/24/23 110/80        MMRC Dyspnea Scale (4 is worst)     [x] MMRC 0: Dyspneic on strenuous excercise (0 points)    [] MMRC 1: Dyspneic on walking a slight hill (0 points)    [] MMRC 2: Dyspneic on walking level ground; must stop occasionally due to breathlessness (1 point)    [] MMRC 3: Must stop for breathlessness after walking 100 yards or after a few minutes (2 points)    [] MMRC 4: Cannot leave house; breathless on dressing/undressing (3 points)       Asthma Control Test  In the past 4  weeks, how much of the time did your asthma keep you from getting as much done at work, school or at home?: None of the time  During the past 4 weeks, how often have you had shortness of breath?: Not at all  During the past 4 weeks, how often did your asthma symptoms (wheezing, couging, shortness of breath, chest tightness or pain) wake you up at night or earlier than usual in the morning?: Not at all  During the past 4 weeks, how often have you used your rescue inhaler or nebulizer medication (such as albuterol)?: 1 or 2 times per day  How would you rate your asthma control during the past 4 weeks?: Completely controlled  If your score is 19 or less, your asthma may not be under control: 22             No flowsheet data found.              Objective:     Vital Signs (Most Recent)  Vital Signs  Pulse: 64  Resp: 17  SpO2: 95 %  BP: 120/82  Height and Weight  Height: 5' (152.4 cm)  Weight: 79.7 kg (175 lb 11.3 oz)  BSA (Calculated - sq m): 1.84 sq meters  BMI (Calculated): 34.3  Weight in (lb) to have BMI = 25: 127.7]  Wt Readings from Last 2 Encounters:   07/07/23 79.7 kg (175 lb 11.3 oz)   07/07/23 79.7 kg (175 lb 11.3 oz)       Physical Exam  Vitals and nursing note reviewed.   Constitutional:       Appearance: She is normal weight.   HENT:      Head: Normocephalic and atraumatic.      Nose: Nose normal.   Eyes:      Pupils: Pupils are equal, round, and reactive  to light.   Cardiovascular:      Rate and Rhythm: Normal rate and regular rhythm.      Pulses: Normal pulses.      Heart sounds: Normal heart sounds.   Pulmonary:      Effort: Pulmonary effort is normal.      Breath sounds: Normal breath sounds.   Abdominal:      General: Bowel sounds are normal.      Palpations: Abdomen is soft.   Musculoskeletal:      Cervical back: Normal range of motion.   Skin:     General: Skin is warm.   Neurological:      General: No focal deficit present.      Mental Status: She is alert and oriented to person, place, and time.   Psychiatric:         Mood and Affect: Mood normal.        Laboratory  Lab Results   Component Value Date    WBC 7.4 02/23/2023    RBC 4.00 02/23/2023    HGB 11.7 02/23/2023    HCT 35.8 02/23/2023    MCV 90 02/23/2023    MCH 29.3 02/23/2023    MCHC 32.7 02/23/2023    RDW 13.5 02/23/2023     02/23/2023    LYMPH 1.4 02/23/2023    MONO 9 02/23/2023    MONO 0.6 02/23/2023    EOS 0.1 02/23/2023    BASO 0.0 02/23/2023    EOSINOPHIL 2 02/23/2023    BASOPHIL 0 02/23/2023       BMP  Lab Results   Component Value Date     02/23/2023    K 4.1 02/23/2023     02/23/2023    CO2 30 (H) 02/23/2023    BUN 20 02/23/2023    CREATININE 1.58 (H) 02/23/2023    CALCIUM 9.2 02/23/2023    EGFRNONAA 37 (L) 01/06/2022    AST 19 02/23/2023    ALT 15 02/23/2023    PROT 7.2 02/19/2020          Lab Results   Component Value Date    IGE 1142 (H) 06/12/2023        No results found for: ASPERGILLUS  No results found for: AFUMIGATUSCL     No results found for: ACE     Diagnostic Results:  I have personally reviewed today the following studies:    X-Ray Chest PA And Lateral  Narrative: EXAM: XR CHEST PA AND LATERAL    CLINICAL HISTORY:  Bronchitis.    TECHNIQUE: 2 views of the chest.    COMPARISON: 05/12/2023 x-ray from outside institution.    FINDINGS:  Heart size is normal.  Mild aortic atherosclerosis is present with elongation of the descending thoracic aorta.  Similar to the  previous study.    The lung fields are clear with no infiltrate.  No pleural effusion.  Impression:  Stable negative two-view chest x-ray.  No acute findings.    Finalized on: 6/12/2023 2:49 PM By:  Daniel Booth MD  [facility]# [id number]      2023-06-12 14:51:[id number]    [facility]     FeNo 12    FEV1: 0.94L( 54.6%), FVC 1.39L( 64.2%)  FEV1/FVC 67    Ordering Provider: Edwar Mathews MD              Interpreting Provider: Edwar Mathews MD  Performing nurse/tech/RT: VT, RT  Diagnosis:  (Severe persistent asthma without complication)  Height: 5' (152.4 cm)  Weight: 79.7 kg (175 lb 11.3 oz)  BMI (Calculated): 34.3              Patient Race:    Phase Oxygen Assessment Supplemental O2 Heart   Rate Blood Pressure Viky Dyspnea Scale Rating   Resting 96 % Room Air 58 bpm 127/65 0   Exercise             Minute             1 94 % Room Air 76 bpm       2 95 % Room Air 81 bpm       3 95 %  87 bpm       4 95 % Room Air 93 bpm       5 96 % Room Air 94 bpm       6  96 % Room Air 92 bpm 137/72 0   Recovery             Minute             1 98 % Room Air 67 bpm       2 98 % Room Air 68 bpm       3 98 % Room Air 66 bpm       4 98 % Room Air 64 bpm 132/74 0      Six Minute Walk Summary  Patient Reported: Other (Comment) (Knee ache)          Interpretation:  Did the patient stop or pause?: No  Total Time Walked (Calculated): 360 seconds  Final Partial Lap Distance (feet): 125 feet  Total Distance Meters (Calculated): 403.86 meters  Predicted Distance Meters (Calculated): 418.79 meters  Percentage of Predicted (Calculated): 96.43  Peak VO2 (Calculated): 16.1  Mets: 4.6  Has The Patient Had a Previous Six Minute Walk Test?: No     Previous 6MWT Results  Has The Patient Had a Previous Six Minute Walk Test?: No       Assessment/Plan:     Problem List Items Addressed This Visit       Hyperlipidemia     On LIPITOR         Essential hypertension, benign     On NORVASC         Severe asthma - Primary    Relevant Medications     fluticasone-umeclidin-vilanter (TRELEGY ELLIPTA) 100-62.5-25 mcg DsDv    Other Relevant Orders    CT Chest Without Contrast    Stress test, pulmonary (Completed)    PFT - related Arterial Blood Gas    EKG 12-lead    Echo    Ambulatory referral/consult to Pulmonary Disease Management w/ Respiratory Therapist      FEV1 appears decreased out of proportion  Continue Current therapy   TRELEGY added       Follow up in about 3 months (around 10/7/2023), or chest ct, 6MWD, ABG, EKG, echo.    This note was prepared using voice recognition system and is likely to have sound alike errors that may have been overlooked even after proof reading.  Please call me with any questions    Discussed diagnosis, its evaluation, treatment and usual course. All questions answered.    Thank you for the courtesy of participating in the care of this patient    Edwar Mathews MD      Personal Diagnostic Review  [x]  CXR    []  ECHO    []  ONSAT    []  6MWD    []  LABS    []  CHEST CT    []  PET CT    []  Biopsy results     EVER Index for COPD Survival Prediction   Select Criteria:   FEV1 % Predicted After Bronchodialator     [] >= 65% (0 points)    [x] 50-64% (1 point)    [] 36-49% (2 points)    [] <= 35% (3 points)   6 Minute Walk Distance     [x] >= 350 Meters (0 points)    [] 250-349 Meters (1 point)    [] 150-249 Meters (2 points)    [] <= 149 Meters (3 points)   MMRC Dyspnea Scale (4 is worst)     [x] MMRC 0: Dyspneic on strenuous excercise (0 points)    [] MMRC 1: Dyspneic on walking a slight hill (0 points)    [] MMRC 2: Dyspneic on walking level ground; must stop occasionally due to breathlessness (1 point)    [] MMRC 3: Must stop for breathlessness after walking 100 yards or after a few minutes (2 points)    [] MMRC 4: Cannot leave house; breathless on dressing/undressing (3 points)   Body Mass Index     [x] > 21 (0 points)    [] <= 21 (1 point)   Results: Total Criteria Point Count:     Approximate 4 Year Survival Interpretation    0-2 Points:  [x] 80%   3-4 Points:  [] 67%   5-6 Points:  [] 57%   7-10 Points:[] 18%         References  Chrissie BR, Sammie CG, Gabe ELAM, et. al. The body-mass index, airflow obstruction, dyspnea and exercise capacity index in chronic obstructive pulmonary disease. N Engl J Med. 2004 Mar 4;350(10):1005-12.  Sergio DA, Wells CK. Evaluation of clinical methods for rating dyspnea. Chest. 1988 Mar;93(3):580-6

## 2023-07-10 ENCOUNTER — HOSPITAL ENCOUNTER (OUTPATIENT)
Dept: CARDIOLOGY | Facility: HOSPITAL | Age: 68
Discharge: HOME | End: 2023-07-10
Attending: INTERNAL MEDICINE
Payer: COMMERCIAL

## 2023-07-10 DIAGNOSIS — J45.50 SEVERE PERSISTENT ASTHMA WITHOUT COMPLICATION: ICD-10-CM

## 2023-07-10 PROCEDURE — 93005 ELECTROCARDIOGRAM TRACING: CPT

## 2023-07-10 PROCEDURE — 93010 ELECTROCARDIOGRAM REPORT: CPT | Mod: ,,, | Performed by: INTERNAL MEDICINE

## 2023-07-10 PROCEDURE — 93010 EKG 12-LEAD: ICD-10-PCS | Mod: ,,, | Performed by: INTERNAL MEDICINE

## 2023-07-13 ENCOUNTER — TELEPHONE (OUTPATIENT)
Dept: PULMONOLOGY | Facility: CLINIC | Age: 68
End: 2023-07-13
Payer: COMMERCIAL

## 2023-07-13 DIAGNOSIS — J45.50 SEVERE PERSISTENT ASTHMA WITHOUT COMPLICATION: Primary | ICD-10-CM

## 2023-07-13 RX ORDER — FLUTICASONE PROPIONATE AND SALMETEROL 100; 50 UG/1; UG/1
1 POWDER RESPIRATORY (INHALATION) 2 TIMES DAILY
Qty: 720 EACH | Refills: 0 | Status: SHIPPED | OUTPATIENT
Start: 2023-07-13 | End: 2024-07-12

## 2023-07-13 NOTE — TELEPHONE ENCOUNTER
Chronic Disease Management:  Contacted [employer] for details on medication coverage.   Patient was prescribed Trelegy for respiratory maintenance therapy but cannot afford the medication.    30 day supply co-pays are as follows:  Trelegy- $133.00   Breo- $78.00  Spiriva- $101.00  Breztri- $124.00   Advair Diskus- $0    Message sent to pulmonary provider to advise.

## 2023-07-14 ENCOUNTER — CLINICAL SUPPORT (OUTPATIENT)
Dept: PULMONOLOGY | Facility: CLINIC | Age: 68
End: 2023-07-14
Payer: COMMERCIAL

## 2023-07-14 VITALS
HEART RATE: 60 BPM | RESPIRATION RATE: 16 BRPM | BODY MASS INDEX: 34.66 KG/M2 | OXYGEN SATURATION: 98 % | WEIGHT: 176.56 LBS | HEIGHT: 60 IN

## 2023-07-14 DIAGNOSIS — J45.50 SEVERE PERSISTENT ASTHMA WITHOUT COMPLICATION: ICD-10-CM

## 2023-07-14 PROCEDURE — 99999 PR PBB SHADOW E&M-EST. PATIENT-LVL IV: CPT | Mod: PBBFAC,,,

## 2023-07-14 PROCEDURE — 99999 PR PBB SHADOW E&M-EST. PATIENT-LVL IV: ICD-10-PCS | Mod: PBBFAC,,,

## 2023-07-14 NOTE — PATIENT INSTRUCTIONS
Understanding Asthma         Asthma is a long-term (chronic) lung condition. It involves the airways (bronchial tubes). It happens when a trigger causes your airways to swell and become narrow. The muscles around your airways start to tighten. When your airways start to narrow, air can't move in and out of your lungs very well. Mucus also builds up along the airways. This makes it even harder to move air in and out of your lungs.  Experts are not exactly sure what causes asthma. It may be caused by a mix of inherited and environmental factors. People with asthma may have no symptoms until they are exposed to an allergen or trigger.  Healthy lungs  Inside your lungs there are branching airways made of stretchy tissue. Each airway is wrapped with bands of muscle. The airways are smaller as they go deeper into the lungs. The smallest airways end in clusters of tiny balloon-like air sacs (alveoli). These clusters are surrounded by blood vessels. When you breathe in (inhale), air enters the lungs. It travels down through the airways until it reaches the air sacs. When you breathe out (exhale), air travels up through the airways and out of the lungs. The airways make mucus that traps particles you breathe in. Normally, the mucus is then swept out of the lungs by tiny hairs (cilia) that line the airways. The mucus is swallowed or coughed up during your day.    What the lungs do  The air you inhale contains oxygen. When oxygen reaches the air sacs, it passes into the blood vessels around the sacs. Your blood then sends oxygen to all of your cells. As you exhale, carbon dioxide is removed in a similar way from the blood in the air sacs, and from your body.    When you have asthma  People with asthma have very sensitive airways. This means the airways react to certain things called triggers. Triggers can include pollen, dust, or smoke. Triggers cause inflammation. This makes the airways swell and become narrow. This is a  long-lasting (chronic) problem. Your airways may not always be narrow enough so that you notice breathing problems.  Symptoms of chronic inflammation include:   Coughing (chronic)   A feeling of tightness in your chest   Feeling short of breath   Wheezing (a whistling noise, especially when breathing out)   Low energy or feeling tired   In some people, over time chronic mild inflammation can lead to lasting (permanent) scarring of airways and loss of lung function.    Asthma flare-ups  When sensitive airways are irritated by a trigger, the muscles around the airways tighten. The lining of the airways swells. Thick, sticky mucus increases and partly clogs the airways. All of this makes it harder to breathe.  Symptoms of flare-ups may include:  Coughing, especially at night. You may not be able to sleep because of coughing.   Getting tired or out of breath easily   Wheezing   Chest tightness   Faster breathing when at rest   Flare-ups can be life-threatening. In a severe flare-up, the muscle tightening, swelling, and mucus are worse. Its very hard to breathe. Your body can't get enough oxygen and can't remove carbon dioxide. Waste gas is trapped in the alveoli. Gas exchange cant occur. The body is not getting enough oxygen. Without oxygen, body tissues, especially brain tissue, begin to get damaged. If this goes on for long, it can lead to severe brain damage or death.  Call 911 (or have someone call for you) if you have any of these symptoms and they are not relieved right away by taking your quick-relief medicine as prescribed:  Trouble breathing   Feeling too short of breath to talk or walk   Lips or fingers turning blue   Feeling lightheaded or dizzy, as though you are about to pass out   Peak flow less than 50% of your personal best, if you use a peak flow meter     Managing your asthma  Asthma is a long-term condition. So its important to work with your healthcare provider to manage it. If you have asthma,  you can prevent flare-ups. Develop an asthma action plan with your healthcare provider. It can help control your asthma and manage your symptoms. An asthma action plan also tells you and your family or friends what to do if your asthma flares up or gets worse.  Take your medicine as prescribed. Also learn about your asthma triggers. Knowing what causes your asthma to flare up in the first place can help you prevent future breathing problems.  If you smoke, get help to quit.   Asthma Trigger Checklist  Allergens, irritants, and other things may trigger your asthma. Check the box next to each of your triggers. After each trigger is a list of ways to avoid it.   Dust mites. Dust mites live in mattresses, bedding, carpets, curtains, and indoor dust.  To kill dust mites, wash bedding in hot water (130°F) each week.  Cover mattress and pillows with special dust-mite-proof cases.  Don't use upholstered furniture like sofas or chairs in the bedroom.  Use allergy-proof filters for air conditioners and furnaces. Replace or clean them as instructed.  If you can, replace carpeting with wood or tile fanta, especially in the bedroom.  Animals. Animals with fur or feathers shed dander (allergens).  It's best to choose a pet that doesn't have fur or feathers, such as a fish or a reptile.  If you have pets, keep them off your bed and out of your bedroom.  Wash your hands and clothes after handling pets.    Mold. Mold grows in damp places, such as bathrooms, basements, and closets.  Ask someone to clean damp areas in your home every week. Or try wearing a face mask while you clean.  Run an exhaust fan while bathing. Or leave a window open in the bathroom.  Repair water leaks in or around your home.  Have someone else cut grass or rake leaves, if possible.  Don't use vaporizers or humidifiers. They encourage mold growth.    Pollen. Pollen from trees, grasses, and weeds is a common allergen. (Flower pollens are generally not a  problem).  Try to learn what types of pollen affect you most. Pollen levels vary depending on the plant, the season, and the time of day.  If possible, use air conditioning instead of opening the windows in your home or car.  Have someone else do yard work, if possible.    Cockroaches. Roaches are found in many homes and produce allergens.  Keep your kitchen clean and dry. A leaky faucet or drain can attract roaches.  Remove garbage from your home daily.  Store food in tightly sealed containers. Wash dishes as soon as they are used.  Use bait stations or traps to control roaches. Avoid using chemical sprays.    Smoke. Smoke may be from cigarettes, cigars, pipes, incense or candles, barbecues or grills, and fireplaces.  Don't smoke. And don't let people smoke in your home or car.  When you travel, ask for nonsmoking rental cars and hotel rooms.  Avoid fireplaces and wood stoves. If you can't, sit away from them. Make sure the smoke is directed outside.  Don't burn incense or use candles.  Move away from smoky outdoor cooking grills.    Smog.  Smog is from car exhaust and other pollution.  Read or listen to local air-quality reports. These let you know when air quality is poor.  Stay indoors as much as you can on smoggy days. If possible, use air conditioning instead of opening the windows.  In your car, set air conditioning to recirculate air, so less pollution gets in.    Strong odors. These include air fresheners, deodorizers, and cleaning products; perfume, deodorant, and other beauty products; incense and candles; and insect sprays and other sprays.  Use scent-free products like deodorant or body lotion.  Avoid using cleaning products with bleach and ammonia. Make your own cleaning solution with white vinegar, baking soda, or mild dish soap.  Use exhaust fans while cooking. Or open a window, if possible.   Avoid perfumes, air fresheners, potpourri, and other scented products.          Other irritants. These  include dust, aerosol sprays, and powders.  Wear a face mask while doing tasks like sanding, dusting, sweeping, and yard work. Open doors and windows if working indoors.  Use pump spray bottles instead of aerosols.  Pour liquid  onto a rag or cloth instead of spraying them.    Weather. Weather conditions can trigger symptoms or make them worse.  Watch for very high or low temperatures, very humid conditions, or a lot of wind, as these conditions can make symptoms worse.  Limit outdoor activity during the type of weather that affects you.  Wear a scarf over your mouth and nose in cold weather.    Colds, flu, and sinus infections. Upper respiratory infections can trigger asthma.  Wash your hands often with soap and warm water or use a hand  containing alcohol.  Get a yearly flu shot. And ask if you should get a pneumonia vaccine.  Take care of your general health. Get plenty of sleep. And eat a variety of healthy foods.    Food additives. Food additives can trigger asthma flare-ups in some people.  Check food labels for sulfites or other similar ingredients. These are often found in foods such as wine, beer, and dried fruits.  Avoid foods that contain these additives.    Medicine. Aspirin, NSAIDS like ibuprofen and naproxen, and heart medicines like beta-blockers may be triggers.  Tell your health care provider if you think certain medicines trigger symptoms.   Be sure to read the labels on over-the-counter medicines. They may have ingredients that cause symptoms for you.   , Emotions. Laughing, crying, or feeling excited are triggers for some people.   To help you stay calm: Try breathing in slowly through your nose for a count of 2 seconds. Close your lips and breathe out for 4 seconds. Repeat.  Try to focus on a soothing image in your mind. This will help relax you and calm your breathing.  Remember to take your daily controller medicines. When you're upset or under stress, it's easy to forget.     Exercise. For some people, exercise can trigger symptoms. Don't let this keep you from being active.   If you have not been exercising regularly, start slow and work up gradually.  Take all of your medicines as prescribed.  If you use quick-relief medicine, make sure you have it with you when you exercise.  Stop if you have any symptoms. Make sure you talk with your provider about these symptoms.  © 2866-7381 [employer]. [address] [place] [id number]. All rights reserved. This information is not intended as a substitute for professional medical care. Always follow your healthcare professional's instructions.             Using Dry-Powder Inhalers (DPIs)  Some asthma medications are inhaled using inhalers. Dry-powder inhalers (DPIs) dispense measured doses of powdered medicine into your lungs. DPIs often have counters to track the number of doses used. Keep in mind that DPIs don't all work the same way. So be sure you know how to use yours properly.  Using a DPI  Load the prescribed dose of medicine by following the instructions that came with the inhaler.  Breathe out normally, holding the inhaler away from your mouth. Hold your chin up.  Put the mouthpiece between your lips. Breathe in deeply through the inhaler--not through your nose. You may not feel or taste the medicine as you breathe in. This is normal.  Take the mouthpiece out of your mouth. Hold your breath for a count of 10, or as long as you can comfortably.  Breathe out slowly--but do not breathe out through the inhaler. Moisture from your breath can make the powder stick inside the inhaler.  Be sure to close the inhaler and store it in a dry place.  Rinse your mouth with water and spit it out, don't swallow it.  Do not wash your DPI with soap and water. To clean the mouthpiece, wipe with a dry cloth as needed.    © 3286-8616 [employer]. [address] [place] [address], [place] [address]. All rights reserved. This  information is not intended as a substitute for professional medical care. Always follow your healthcare professional's instructions.            Using an Inhaler with a Spacer  To control asthma, you need to use your medicines the right way. Some medicines are inhaled using a device called a metered-dose inhaler (MDI). Metered-dose inhalers deliver medicine with a fine spray. You may be asked to use a spacer (holding tube) with your inhaler. The spacer helps make sure all the medicine you need goes into your lungs.   Steps for using an inhaler with a spacer  Step 1:  Remove the caps from the inhaler and spacer.  Shake the inhaler well and attach the spacer. If the inhaler is being used for the first time or has not been used for a while, prime it as directed by the product maker.  Step 2:  Breathe out normally.  Put the spacer between your teeth. Close your lips tightly around it.  Keep your chin up.  Step 3:  Spray 1 puff into the spacer by pressing down on the inhaler.  Then breathe in through your mouth as slowly and deeply as you can. This should take about 5-10 seconds. If you breathe too quickly, you may hear a whistling sound in certain spacers.  Step 4:  Take the spacer out of your mouth.  Hold your breath for a count of 10.  Then hold your lips together and slowly breathe out through your mouth ACTION PLAN    GREEN ZONE  My sputum is clear/white/usual color and easily cleared.  My breathing is no harder than usual.  I can do my usual activities.  I can think clearly.   Take your usual medicines, including oxygen, as you are told to do so by your health care provider.   YELLOW ZONE  My sputum has change (color, thickness, amount).  I am more short of breath than usual.  I cough or wheeze more.  I weigh more and my legs/feet swell.  I cannot do my usual activities without resting.   Call your health care provider. You will probably be told to begin taking an antibiotic and prednisone. Have your pharmacy phone  number available.   RED ZONE  I cannot cough out my sputum.  I am much more short of breath than normal.  I need to sit up to breathe  I cannot do my usual activities.  I am unable to speak more than one or two words at a time.  I am confused.   Call your health care provider. You may be asked to come in to be seen, told to go to the emergency room, or told to call 9-1-1.

## 2023-07-14 NOTE — PROGRESS NOTES
Pulmonary Disease Management  [facility]  Initial Visit    Referring Provider: MD Reid  Diagnosis: Severe asthma   Last Hospital Admission: 3/9/21  Last provider visit: 7/7/23      Current Outpatient Medications:     albuterol (PROVENTIL/VENTOLIN HFA) 90 mcg/actuation inhaler, INHALE 2 PUFFS INTO THE LUNGS EVERY 4 HOURS AS NEEDED FOR WHEEZING OR RESCUE, Disp: 6.7 g, Rfl: 5    amLODIPine (NORVASC) 10 MG tablet, Take 1 tablet (10 mg total) by mouth once daily., Disp: 30 tablet, Rfl: 5    atorvastatin (LIPITOR) 80 MG tablet, Take 1 tablet (80 mg total) by mouth once daily., Disp: 30 tablet, Rfl: 5    benzonatate (TESSALON) 200 MG capsule, Take 200 capsules by mouth every 8 (eight) hours as needed., Disp: , Rfl:     chlorhexidine (PERIDEX) 0.12 % solution, PLEASE SEE ATTACHED FOR DETAILED DIRECTIONS, Disp: , Rfl:     cholecalciferol, vitamin D3, 1,250 mcg (50,000 unit) capsule, TAKE 1 CAPSULE BY MOUTH ONE TIME PER WEEK, Disp: 5 capsule, Rfl: 5    doxazosin (CARDURA) 4 MG tablet, Take 1 tablet (4 mg total) by mouth every evening., Disp: 30 tablet, Rfl: 5    fluconazole (DIFLUCAN) 150 MG Tab, Take one tab with onset of yeast ; repeat in 48 hours if stil with sxs, Disp: 2 tablet, Rfl: 0    fluticasone-salmeterol diskus inhaler 100-50 mcg, Inhale 1 puff into the lungs 2 (two) times daily. Controller, Disp: 720 each, Rfl: 0    hydrocodone-acetaminophen (HYCET) solution 7.5-325 mg/15mL, TAKE 5ML'S BY MOUTH EVERY 6 HOURS AS NEEDED FOR PAIN OR COUGH, Disp: , Rfl:     ibuprofen (ADVIL,MOTRIN) 800 MG tablet, TAKE 1 TABLET BY MOUTH EVERY 6 TO 8 HOURS WITH FOOD AS NEEDED FOR PAIN (MAX 4 TABS/DAY), Disp: , Rfl:     lisinopriL (PRINIVIL,ZESTRIL) 40 MG tablet, Take 1 tablet (40 mg total) by mouth 2 (two) times daily., Disp: 60 tablet, Rfl: 5    metoprolol succinate (TOPROL-XL) 100 MG 24 hr tablet, TAKE 1 AND 1/2 TABLETS BY MOUTH ONCE DAILY, Disp: 45 tablet, Rfl: 5    predniSONE (DELTASONE) 20 MG tablet,  Take two tabs poqd x 5 days (Patient not taking: Reported on 6/12/2023), Disp: 10 tablet, Rfl: 0    Review of patient's allergies indicates:  No Known Allergies    Smoking history:   Social History     Tobacco Use   Smoking Status Never   Smokeless Tobacco Never       Pulse: 60  SpO2: 98 %  Resp: 16  Height: 5' (152.4 cm)  Weight: 80.1 kg (176 lb 9.4 oz)  BMI (kg/m2): 34.56  Current Oxygen Use: No  DME Provider: [facility]   Does the patient use BiPAP, CPAP or Trilogy?: No     ACT Questionnaire:  Asthma Control Test  In the past 4  weeks, how much of the time did your asthma keep you from getting as much done at work, school or at home?: None of the time  During the past 4 weeks, how often have you had shortness of breath?: Once or twice a week  During the past 4 weeks, how often did your asthma symptoms (wheezing, couging, shortness of breath, chest tightness or pain) wake you up at night or earlier than usual in the morning?: Not at all  During the past 4 weeks, how often have you used your rescue inhaler or nebulizer medication (such as albuterol)?: 1 or 2 times per day  How would you rate your asthma control during the past 4 weeks?: Well controlled  If your score is 19 or less, your asthma may not be under control: 20  Has this patient had any pulmonary studies (PFTS)?: Yes  PFT Results:  Pre FVC   Date/Time Value Ref Range Status   07/07/2023 10:07 AM 1.39 L Final     Pre FEV1   Date/Time Value Ref Range Status   07/07/2023 10:07 AM 0.94 L Final     Pre FEV1 FVC   Date/Time Value Ref Range Status   07/07/2023 10:07 AM 67.36 % Final     Is this patient a candidate for pulmonary rehab?: No  Method Used for Education: Literature, Demonstration, Verbal  Did the patient demonstrate understanding?: Yes  What tests has the patient had done today?: Spirometry, Six Minute Walk      Educational assessment:   [x]            Good  []            Fair  []            Poor    Readiness to learn:   [x]            Good  []             Fair  []            Poor    Vision Status:   [x]            Good  []            Fair  []            Poor    Reading Ability:  [x]            Good  []            Fair  []            Poor    Knowledge of condition:   [x]            Good  []            Fair  []            Poor    Language Barriers:   []            Good  []            Fair  []            Poor  [x]            None    Cognitive/ Physical Barriers:   []            Good  []            Fair  []            Poor  [x]            None    Learning best by:                       [x]            Seeing  []            Hearing  []            Reading                         [x]            Doing    Describe any barrier /Limitation or financial implications of care choices identified     []            Financial  []            Emotional  []            Education  []            Vision/Hearing  []            Physical  [x]            None  []                TOPIC /CONTENT FOR TODAY:    [x]            MDI with or without spacer  [x]            Dry powder inhaler  []            Acapella   []           Peak Flow meter  [x]            Action plan  [x]            Nebulizer use  []            Oxygen use safety  [x]            Breathing and cough techniques  [x]            Energy conservation  [x]            Infection prevention  []           OTHER________________________        Learner:    [x]            Patient   []            Caregiver    Method:    [x]            Verbal explanation  [x]            Audio visual    [x]            Literature  [x]            Teach back      Evaluation:    [x]            Teach back  [x]            Demonstrate  [x]            Follow up phone call    []            2 weeks     [x]            4 weeks   [x]            PRN      Therapist Comments:   Patient was seen today to review respiratory medication purpose and proper technique for use of inhalers. Patient practiced proper technique using MDI with valved holding chamber (spacer) and DPI inhalers.  Patient demonstrated understanding. Literature was given to patient.     Asthma trigger checklist was verbally reviewed and literature given to patient.     Infection prevention was discussed. Patient was reminded to get influenza vaccine. Hand hygiene and cleaning of respiratory equipment was also discussed. Patient verbalized understanding.      Asthma action plan was reviewed and literature was given to patient. Patient verbalized understanding.      Plan:  Monthly Pulmonary Disease Management Questionnaire  Follow-up PDM appointment scheduled for 1/23/24  Reinforce education  Meds: Advair Diskus, ventolin HFA   DME Needs: [facility]   Action Plan: Asthma   Immunization: Pneumococcal- current, Flu-current, Covid- current   Next Provider Visit: 10/20/23  Next Spirometry/CPFT: not scheduled   Approximate time spent with patient: 45 mins

## 2023-09-11 PROBLEM — J01.00 ACUTE NON-RECURRENT MAXILLARY SINUSITIS: Status: RESOLVED | Noted: 2023-06-08 | Resolved: 2023-09-11

## 2023-10-20 ENCOUNTER — HOSPITAL ENCOUNTER (OUTPATIENT)
Dept: RADIOLOGY | Facility: HOSPITAL | Age: 68
Discharge: HOME | End: 2023-10-20
Attending: INTERNAL MEDICINE
Payer: COMMERCIAL

## 2023-10-20 ENCOUNTER — HOSPITAL ENCOUNTER (OUTPATIENT)
Dept: CARDIOLOGY | Facility: HOSPITAL | Age: 68
Discharge: HOME | End: 2023-10-20
Attending: INTERNAL MEDICINE
Payer: COMMERCIAL

## 2023-10-20 ENCOUNTER — CLINICAL SUPPORT (OUTPATIENT)
Dept: PULMONOLOGY | Facility: CLINIC | Age: 68
End: 2023-10-20
Attending: INTERNAL MEDICINE
Payer: COMMERCIAL

## 2023-10-20 VITALS
WEIGHT: 175.25 LBS | SYSTOLIC BLOOD PRESSURE: 132 MMHG | OXYGEN SATURATION: 95 % | HEART RATE: 60 BPM | HEIGHT: 60 IN | HEIGHT: 60 IN | BODY MASS INDEX: 33.96 KG/M2 | BODY MASS INDEX: 34.41 KG/M2 | SYSTOLIC BLOOD PRESSURE: 126 MMHG | DIASTOLIC BLOOD PRESSURE: 78 MMHG | WEIGHT: 173 LBS | DIASTOLIC BLOOD PRESSURE: 86 MMHG | RESPIRATION RATE: 16 BRPM

## 2023-10-20 DIAGNOSIS — J45.50 SEVERE PERSISTENT ASTHMA WITHOUT COMPLICATION: ICD-10-CM

## 2023-10-20 DIAGNOSIS — J45.50 SEVERE PERSISTENT ASTHMA WITHOUT COMPLICATION: Primary | ICD-10-CM

## 2023-10-20 DIAGNOSIS — E78.2 MIXED HYPERLIPIDEMIA: ICD-10-CM

## 2023-10-20 DIAGNOSIS — I10 ESSENTIAL HYPERTENSION, BENIGN: ICD-10-CM

## 2023-10-20 PROBLEM — R06.2 WHEEZING: Status: RESOLVED | Noted: 2022-08-12 | Resolved: 2023-10-20

## 2023-10-20 LAB
ALLENS TEST: ABNORMAL
AORTIC ROOT ANNULUS: 2.72 CM
ASCENDING AORTA: 3.08 CM
AV INDEX (PROSTH): 0.73
AV MEAN GRADIENT: 5 MMHG
AV PEAK GRADIENT: 9 MMHG
AV VALVE AREA BY VELOCITY RATIO: 2.37 CM²
AV VALVE AREA: 2.28 CM²
AV VELOCITY RATIO: 0.76
BSA FOR ECHO PROCEDURE: 1.82 M2
CV ECHO LV RWT: 0.41 CM
DELSYS: ABNORMAL
DOP CALC AO PEAK VEL: 1.51 M/S
DOP CALC AO VTI: 38 CM
DOP CALC LVOT AREA: 3.1 CM2
DOP CALC LVOT DIAMETER: 1.99 CM
DOP CALC LVOT PEAK VEL: 1.15 M/S
DOP CALC LVOT STROKE VOLUME: 86.73 CM3
DOP CALC RVOT PEAK VEL: 0.71 M/S
DOP CALC RVOT VTI: 16.1 CM
DOP CALCLVOT PEAK VEL VTI: 27.9 CM
E WAVE DECELERATION TIME: 254.62 MSEC
E/A RATIO: 0.93
E/E' RATIO: 13.69 M/S
ECHO LV POSTERIOR WALL: 0.88 CM (ref 0.6–1.1)
FIO2: 21
FRACTIONAL SHORTENING: 35 % (ref 28–44)
HCO3 UR-SCNC: 28.5 MMOL/L (ref 24–28)
INTERVENTRICULAR SEPTUM: 1.31 CM (ref 0.6–1.1)
IVC DIAMETER: 1.59 CM
IVRT: 105.61 MSEC
LA MAJOR: 4.59 CM
LA MINOR: 4.82 CM
LA WIDTH: 3.9 CM
LEFT ATRIUM SIZE: 3.25 CM
LEFT ATRIUM VOLUME INDEX MOD: 29.1 ML/M2
LEFT ATRIUM VOLUME INDEX: 28.8 ML/M2
LEFT ATRIUM VOLUME MOD: 51.19 CM3
LEFT ATRIUM VOLUME: 50.66 CM3
LEFT INTERNAL DIMENSION IN SYSTOLE: 2.82 CM (ref 2.1–4)
LEFT VENTRICLE DIASTOLIC VOLUME INDEX: 48.04 ML/M2
LEFT VENTRICLE DIASTOLIC VOLUME: 84.55 ML
LEFT VENTRICLE MASS INDEX: 93 G/M2
LEFT VENTRICLE SYSTOLIC VOLUME INDEX: 17.1 ML/M2
LEFT VENTRICLE SYSTOLIC VOLUME: 30.08 ML
LEFT VENTRICULAR INTERNAL DIMENSION IN DIASTOLE: 4.33 CM (ref 3.5–6)
LEFT VENTRICULAR MASS: 163.66 G
LV LATERAL E/E' RATIO: 11.13 M/S
LV SEPTAL E/E' RATIO: 17.8 M/S
LVOT MG: 2.67 MMHG
LVOT MV: 0.78 CM/S
MODE: ABNORMAL
MV PEAK A VEL: 0.96 M/S
MV PEAK E VEL: 0.89 M/S
MV STENOSIS PRESSURE HALF TIME: 73.84 MS
MV VALVE AREA P 1/2 METHOD: 2.98 CM2
PCO2 BLDA: 43.6 MMHG (ref 35–45)
PH SMN: 7.42 [PH] (ref 7.35–7.45)
PISA TR MAX VEL: 2.95 M/S
PO2 BLDA: 87 MMHG (ref 80–100)
POC BE: 4 MMOL/L
POC SATURATED O2: 97 % (ref 95–100)
PV MEAN GRADIENT: 1 MMHG
PV MV: 0.69 M/S
PV PEAK GRADIENT: 3 MMHG
PV PEAK VELOCITY: 0.92 M/S
RA MAJOR: 3.66 CM
RA PRESSURE ESTIMATED: 3 MMHG
RA WIDTH: 3.25 CM
RIGHT VENTRICULAR END-DIASTOLIC DIMENSION: 3.09 CM
RV TB RVSP: 6 MMHG
SAMPLE: ABNORMAL
SINUS: 2.58 CM
SITE: ABNORMAL
STJ: 2.37 CM
TDI LATERAL: 0.08 M/S
TDI SEPTAL: 0.05 M/S
TDI: 0.07 M/S
TR MAX PG: 35 MMHG
TRICUSPID ANNULAR PLANE SYSTOLIC EXCURSION: 1.88 CM
TV REST PULMONARY ARTERY PRESSURE: 38 MMHG
Z-SCORE OF LEFT VENTRICULAR DIMENSION IN END DIASTOLE: -1.17
Z-SCORE OF LEFT VENTRICULAR DIMENSION IN END SYSTOLE: -0.52

## 2023-10-20 PROCEDURE — 36600 WITHDRAWAL OF ARTERIAL BLOOD: CPT | Mod: S$GLB,,, | Performed by: INTERNAL MEDICINE

## 2023-10-20 PROCEDURE — 93306 TTE W/DOPPLER COMPLETE: CPT

## 2023-10-20 PROCEDURE — 82803 BLOOD GASES ANY COMBINATION: CPT | Mod: S$GLB,,, | Performed by: INTERNAL MEDICINE

## 2023-10-20 PROCEDURE — 99999 PR PBB SHADOW E&M-EST. PATIENT-LVL V: ICD-10-PCS | Mod: PBBFAC,,, | Performed by: INTERNAL MEDICINE

## 2023-10-20 PROCEDURE — 71250 CT THORAX DX C-: CPT | Mod: 26,,, | Performed by: RADIOLOGY

## 2023-10-20 PROCEDURE — 71250 CT THORAX DX C-: CPT | Mod: TC

## 2023-10-20 PROCEDURE — 90694 VACC AIIV4 NO PRSRV 0.5ML IM: CPT | Mod: S$GLB,,, | Performed by: INTERNAL MEDICINE

## 2023-10-20 PROCEDURE — 99999 PR PBB SHADOW E&M-EST. PATIENT-LVL V: CPT | Mod: PBBFAC,,, | Performed by: INTERNAL MEDICINE

## 2023-10-20 PROCEDURE — 71250 CT CHEST WITHOUT CONTRAST: ICD-10-PCS | Mod: 26,,, | Performed by: RADIOLOGY

## 2023-10-20 PROCEDURE — 36600 PR WITHDRAWAL OF ARTERIAL BLOOD: ICD-10-PCS | Mod: S$GLB,,, | Performed by: INTERNAL MEDICINE

## 2023-10-20 PROCEDURE — 99214 PR OFFICE/OUTPT VISIT, EST, LEVL IV, 30-39 MIN: ICD-10-PCS | Mod: 25,S$GLB,, | Performed by: INTERNAL MEDICINE

## 2023-10-20 PROCEDURE — 90471 FLU VACCINE - QUADRIVALENT - ADJUVANTED: ICD-10-PCS | Mod: S$GLB,,, | Performed by: INTERNAL MEDICINE

## 2023-10-20 PROCEDURE — 82803 PR  BLOOD GASES: PH, PO2 & PCO2: ICD-10-PCS | Mod: S$GLB,,, | Performed by: INTERNAL MEDICINE

## 2023-10-20 PROCEDURE — 90471 IMMUNIZATION ADMIN: CPT | Mod: S$GLB,,, | Performed by: INTERNAL MEDICINE

## 2023-10-20 PROCEDURE — 90694 FLU VACCINE - QUADRIVALENT - ADJUVANTED: ICD-10-PCS | Mod: S$GLB,,, | Performed by: INTERNAL MEDICINE

## 2023-10-20 PROCEDURE — 93306 TTE W/DOPPLER COMPLETE: CPT | Mod: 26,,, | Performed by: INTERNAL MEDICINE

## 2023-10-20 PROCEDURE — 99214 OFFICE O/P EST MOD 30 MIN: CPT | Mod: 25,S$GLB,, | Performed by: INTERNAL MEDICINE

## 2023-10-20 PROCEDURE — 93306 ECHO (CUPID ONLY): ICD-10-PCS | Mod: 26,,, | Performed by: INTERNAL MEDICINE

## 2023-10-20 NOTE — ASSESSMENT & PLAN NOTE
Asthma severity score 20   No cough no wheezing no shortness a breath   Stable on current regimen albuterol and Advair   Flu shot today   Annual follow-up

## 2023-10-20 NOTE — PROGRESS NOTES
Pulmonary Outpatient  Visit     Subjective:       Patient ID: Aminah Dockery is a 68 y.o. female.    Social History     Tobacco Use   Smoking Status Never   Smokeless Tobacco Never            Chief Complaint: Results      Aminah Dockery is 68 y.o.  Referred by Susanne Gimenez MD   Hx COVID x 3  Recently Bronchitis: CXR : April 2023  Cough syrup, Abx,   Walk with SOB, [facility] ER, CXR, EKG  Prednisone and Abx  May 12th: went to ER: Steriods given  Felt better  Wheezing improved  PCP gave: prednisone and abx;Levaquin  Denies fever  Works at [employer]  Has Inhaler, tessalon, Nebulizer with Budesonide.  Brother Had asthma    CXR reviewed: normal    07/7/2023  Followup to review tests  Feels much better  FeNo12  Cough, SOB resolved  However FEV1 54.6%  Added TRELEGY  Reduced FVC and FEV1 raise concern for ILD  Hx COVID and 2nd hand tobacco exposure  6MWD: normal capacity: no desturation    10/20/2023  Follow up  Last seen 07/07/2023  Cough and SOB resolved  ACT score 20  Stable Advair and KJ  Echo results pending: PA pressure 38  CT chest reveiwed 3 small nodules not requiring follow-up              Review of Systems   HENT:  Negative for postnasal drip.    Respiratory:  Negative for snoring and cough.    All other systems reviewed and are negative.      Outpatient Encounter Medications as of 10/20/2023   Medication Sig Dispense Refill    albuterol (PROVENTIL/VENTOLIN HFA) 90 mcg/actuation inhaler INHALE 2 PUFFS BY MOUTH EVERY 4 HOURS AS NEEDED FOR WHEEZING 6.7 g 5    amLODIPine (NORVASC) 10 MG tablet Take 1 tablet (10 mg total) by mouth once daily. 30 tablet 5    atorvastatin (LIPITOR) 80 MG tablet Take 1 tablet (80 mg total) by mouth once daily. 30 tablet 5    benzonatate (TESSALON) 200 MG capsule Take 200 capsules by mouth every 8 (eight) hours as needed.      budesonide 1 mg/2 mL NbSp USE 1 VIAL VIA NEBULIZER DAILY 180 mL 5    cholecalciferol, vitamin D3, 1,250 mcg  (50,000 unit) capsule TAKE 1 CAPSULE BY MOUTH ONE TIME PER WEEK 5 capsule 5    doxazosin (CARDURA) 4 MG tablet Take 1 tablet (4 mg total) by mouth every evening. 90 tablet 1    fluticasone-salmeterol diskus inhaler 100-50 mcg Inhale 1 puff into the lungs 2 (two) times daily. Controller 720 each 0    ibuprofen (ADVIL,MOTRIN) 800 MG tablet TAKE 1 TABLET BY MOUTH EVERY 6 TO 8 HOURS WITH FOOD AS NEEDED FOR PAIN (MAX 4 TABS/DAY)      lisinopriL (PRINIVIL,ZESTRIL) 40 MG tablet Take 1 tablet (40 mg total) by mouth 2 (two) times daily. 60 tablet 5    metoprolol succinate (TOPROL-XL) 100 MG 24 hr tablet TAKE 1 AND 1/2 TABLETS BY MOUTH ONCE DAILY 45 tablet 5     No facility-administered encounter medications on file as of 10/20/2023.       The following portions of the patient's history were reviewed and updated as appropriate: She  has a past medical history of Disorder of kidney and ureter, Hyperlipidemia, Hypertension, Routine general medical examination at a health care facility (07/14/2017), and Vitamin D deficiency.  She does not have any pertinent problems on file.  She  has a past surgical history that includes Hysterectomy.  Her family history includes Cancer in her mother; Hypertension in her father; Stroke in her father.  She  reports that she has never smoked. She has never used smokeless tobacco. She reports current alcohol use. She reports that she does not use drugs.  She has a current medication list which includes the following prescription(s): albuterol, amlodipine, atorvastatin, benzonatate, budesonide, cholecalciferol (vitamin d3), doxazosin, fluticasone-salmeterol 100-50 mcg/dose, ibuprofen, lisinopril, and metoprolol succinate.  Current Outpatient Medications on File Prior to Visit   Medication Sig Dispense Refill    albuterol (PROVENTIL/VENTOLIN HFA) 90 mcg/actuation inhaler INHALE 2 PUFFS BY MOUTH EVERY 4 HOURS AS NEEDED FOR WHEEZING 6.7 g 5    amLODIPine (NORVASC) 10 MG tablet Take 1 tablet (10 mg  total) by mouth once daily. 30 tablet 5    atorvastatin (LIPITOR) 80 MG tablet Take 1 tablet (80 mg total) by mouth once daily. 30 tablet 5    benzonatate (TESSALON) 200 MG capsule Take 200 capsules by mouth every 8 (eight) hours as needed.      budesonide 1 mg/2 mL NbSp USE 1 VIAL VIA NEBULIZER DAILY 180 mL 5    cholecalciferol, vitamin D3, 1,250 mcg (50,000 unit) capsule TAKE 1 CAPSULE BY MOUTH ONE TIME PER WEEK 5 capsule 5    doxazosin (CARDURA) 4 MG tablet Take 1 tablet (4 mg total) by mouth every evening. 90 tablet 1    fluticasone-salmeterol diskus inhaler 100-50 mcg Inhale 1 puff into the lungs 2 (two) times daily. Controller 720 each 0    ibuprofen (ADVIL,MOTRIN) 800 MG tablet TAKE 1 TABLET BY MOUTH EVERY 6 TO 8 HOURS WITH FOOD AS NEEDED FOR PAIN (MAX 4 TABS/DAY)      lisinopriL (PRINIVIL,ZESTRIL) 40 MG tablet Take 1 tablet (40 mg total) by mouth 2 (two) times daily. 60 tablet 5    metoprolol succinate (TOPROL-XL) 100 MG 24 hr tablet TAKE 1 AND 1/2 TABLETS BY MOUTH ONCE DAILY 45 tablet 5     No current facility-administered medications on file prior to visit.     She has No Known Allergies..      BP Readings from Last 3 Encounters:   10/20/23 126/78   10/20/23 132/86   08/18/23 132/86        MMRC Dyspnea Scale (4 is worst)     [x] MMRC 0: Dyspneic on strenuous excercise (0 points)    [] MMRC 1: Dyspneic on walking a slight hill (0 points)    [] MMRC 2: Dyspneic on walking level ground; must stop occasionally due to breathlessness (1 point)    [] MMRC 3: Must stop for breathlessness after walking 100 yards or after a few minutes (2 points)    [] MMRC 4: Cannot leave house; breathless on dressing/undressing (3 points)       Asthma Control Test  In the past 4  weeks, how much of the time did your asthma keep you from getting as much done at work, school or at home?: None of the time  During the past 4 weeks, how often have you had shortness of breath?: Not at all  During the past 4 weeks, how often did your  asthma symptoms (wheezing, couging, shortness of breath, chest tightness or pain) wake you up at night or earlier than usual in the morning?: Not at all  During the past 4 weeks, how often have you used your rescue inhaler or nebulizer medication (such as albuterol)?: 1 or 2 times per day  How would you rate your asthma control during the past 4 weeks?: Somewhat controlled  If your score is 19 or less, your asthma may not be under control: 20    Asthma Control Test  In the past 4  weeks, how much of the time did your asthma keep you from getting as much done at work, school or at home?: None of the time  During the past 4 weeks, how often have you had shortness of breath?: Not at all  During the past 4 weeks, how often did your asthma symptoms (wheezing, couging, shortness of breath, chest tightness or pain) wake you up at night or earlier than usual in the morning?: Not at all  During the past 4 weeks, how often have you used your rescue inhaler or nebulizer medication (such as albuterol)?: 1 or 2 times per day  How would you rate your asthma control during the past 4 weeks?: Somewhat controlled  If your score is 19 or less, your asthma may not be under control: 20           No flowsheet data found.              Objective:     Vital Signs (Most Recent)  Vital Signs  Pulse: 60  Resp: 16  SpO2: 95 %  BP: 126/78  Height and Weight  Height: 5' (152.4 cm)  Weight: 79.5 kg (175 lb 4.3 oz)  BSA (Calculated - sq m): 1.83 sq meters  BMI (Calculated): 34.2  Weight in (lb) to have BMI = 25: 127.7]  Wt Readings from Last 2 Encounters:   10/20/23 79.5 kg (175 lb 4.3 oz)   10/20/23 78.5 kg (173 lb)       Physical Exam  Vitals and nursing note reviewed.   Constitutional:       Appearance: She is normal weight.   HENT:      Head: Normocephalic and atraumatic.      Nose: Nose normal.   Eyes:      Pupils: Pupils are equal, round, and reactive to light.   Cardiovascular:      Rate and Rhythm: Normal rate and regular rhythm.       "Pulses: Normal pulses.      Heart sounds: Normal heart sounds.   Pulmonary:      Effort: Pulmonary effort is normal.      Breath sounds: Normal breath sounds.   Abdominal:      General: Bowel sounds are normal.      Palpations: Abdomen is soft.   Musculoskeletal:      Cervical back: Normal range of motion.   Skin:     General: Skin is warm.   Neurological:      General: No focal deficit present.      Mental Status: She is alert and oriented to person, place, and time.   Psychiatric:         Mood and Affect: Mood normal.          Laboratory  Lab Results   Component Value Date    WBC 6.6 07/18/2023    RBC 3.72 (L) 07/18/2023    HGB 10.9 (L) 07/18/2023    HCT 32.8 (L) 07/18/2023    MCV 88 07/18/2023    MCH 29.3 07/18/2023    MCHC 33.2 07/18/2023    RDW 13.5 07/18/2023     07/18/2023    LYMPH 1.6 07/18/2023    MONO 11 07/18/2023    MONO 0.7 07/18/2023    EOS 0.1 07/18/2023    BASO 0.0 07/18/2023    EOSINOPHIL 2 07/18/2023    BASOPHIL 1 07/18/2023       BMP  Lab Results   Component Value Date     07/18/2023    K 4.0 07/18/2023     07/18/2023    CO2 24 07/18/2023    BUN 21 07/18/2023    CREATININE 1.74 (H) 07/18/2023    CALCIUM 8.9 07/18/2023    EGFRNONAA 37 (L) 01/06/2022    AST 18 07/18/2023    ALT 14 07/18/2023    PROT 7.2 02/19/2020          Lab Results   Component Value Date    IGE 1142 (H) 06/12/2023        No results found for: "ASPERGILLUS"  No results found for: "AFUMIGATUSCL"     No results found for: "ACE"     Diagnostic Results:  I have personally reviewed today the following studies:    Echo    Left Ventricle: The left ventricle is normal in size. Normal wall   thickness. Normal wall motion. There is normal systolic function with a   visually estimated ejection fraction of 60 - 65%. There is normal   diastolic function.    Right Ventricle: Normal right ventricular cavity size. Wall thickness   is normal. Right ventricle wall motion  is normal. Systolic function is   normal.    Mitral Valve: " There is no stenosis.    Tricuspid Valve: There is mild regurgitation with a centrally directed   jet.    Pulmonary Artery: The estimated pulmonary artery systolic pressure is   38 mmHg.    IVC/SVC: Normal venous pressure at 3 mmHg.  CT Chest Without Contrast  Narrative: EXAM: CT CHEST WITHOUT CONTRAST    HISTORY: Chronic shortness of breath.    TECHNIQUE: Noncontrast CT scan of through the chest performed.    FINDINGS: There are three adjacent small nodules in the periphery of the right lower lobe measuring up to 3 mm in diameter.  No significant nodules 6 mm or greater are demonstrated.  The lung fields are otherwise clear.  There is a 1 cm cyst or pneumatocele in the posterior left lung base.  No pleural fluid is identified.  No pleural masses.  Aortic and coronary artery calcifications.  No enlarged lymph nodes are identified.  No significant osseous abnormality is identified.  Limited evaluation of the upper abdomen is unremarkable.  Impression:  No acute findings.  Three small pulmonary nodules in the right lung base requiring no follow-up.  Aortic and coronary atherosclerosis.    All CT scans at [this location] are performed using dose modulation techniques as appropriate to a performed exam including the following:  Automated exposure control; adjustment of the mA and/or kV according to patient size (this includes techniques or standardized protocols for targeted exams where dose is matched to indication / reason for exam; i.e. extremities or head); use of iterative reconstruction technique.    Finalized on: 10/20/2023 12:47 PM By:  Mateo Dominguez MD  [facility]# [id number]      2023-10-20 12:49:10.987    [facility]          FEV1: 0.94L( 54.6%), FVC 1.39L( 64.2%)  FEV1/FVC 67       Recent Labs     10/20/23  1052   PH 7.424   PCO2 43.6   PO2 87   HCO3 28.5*   POCSATURATED 97   BE 4*      Assessment/Plan:     Problem List Items Addressed This Visit       Hyperlipidemia     On Lipitor         Essential hypertension, benign      Stable on Norvasc         Severe asthma - Primary     Asthma severity score 20   No cough no wheezing no shortness a breath   Stable on current regimen albuterol and Advair   Flu shot today   Annual follow-up         Relevant Orders    Fraction of  Nitric Oxide    Spirometry with/without bronchodilator    X-Ray Chest PA And Lateral        Continue Current therapy  Advair       Follow up in about 1 year (around 10/20/2024), or Flu shot, CXR, [place], FeNo.    This note was prepared using voice recognition system and is likely to have sound alike errors that may have been overlooked even after proof reading.  Please call me with any questions    Discussed diagnosis, its evaluation, treatment and usual course. All questions answered.    Thank you for the courtesy of participating in the care of this patient    Edwar Mathews MD      Personal Diagnostic Review  [x]  CXR    []  ECHO    []  ONSAT    []  6MWD    []  LABS    []  CHEST CT    []  PET CT    []  Biopsy results

## 2023-10-20 NOTE — PROCEDURES
ABG completed. See ABG Below.     Latest Reference Range & Units 10/20/23 10:52   POC PH 7.35 - 7.45  7.424   POC PCO2 35 - 45 mmHg 43.6   POC PO2 80 - 100 mmHg 87   POC HCO3 24 - 28 mmol/L 28.5 (H)   POC SATURATED O2 95 - 100 % 97   Sample  ARTERIAL   POC BE -2 to 2 mmol/L 4 (H)   FiO2  21   DelSys  Room Air   Site  RR   Mode  SPONT   (H): Data is abnormally high      Interpretation:  [x] Arterial blood gases on room air demonstrate normal pCO2 and pO2  [] Arterial blood gases on room air are abnormal demonstrating hypercarbia (pCO2 >45 mmHg)  [] Arterial blood gases on room air are abnormal demonstrating hypocarbia (pCO2 < 35 mmHg)  [] Arterial blood gases on room air are abnormal demonstrating hypoxemia (pO2 < 80 mmHg)  [] Arterial blood gases on room air are abnormal demonstrating hyperoxemia (pO2 >120 mmHg)  [] Arterial blood gases on room air are abnormal demonstrating hypoxemia (pO2 < 80 mmHg) and hypercarbia (pCO2>45 mmHg)    The table below summarizes the main interpretations of the relationship between the arterial blood gases, pH, pCO2 and HCO-3    []    I

## 2023-12-15 ENCOUNTER — TELEPHONE (OUTPATIENT)
Dept: PULMONOLOGY | Facility: CLINIC | Age: 68
End: 2023-12-15
Payer: COMMERCIAL

## 2023-12-15 DIAGNOSIS — J45.50 SEVERE PERSISTENT ASTHMA WITHOUT COMPLICATION: Primary | ICD-10-CM

## 2023-12-15 NOTE — TELEPHONE ENCOUNTER
Tried calling pt to schedule appt. No answer, vm was full.     Edwar Mathews MD [facility]  Appt Melany , Nidia Pulido or Jesusita in 4 weeks  CXR, josh          Previous Messages       ----- Message -----  From: Jose R Guevara ([facility])  Sent: 12/14/2023   8:35 AM CST  To: Edwar Mathews MD ([facility])  Subject: General Communication on 12/14/2023

## 2024-01-24 ENCOUNTER — TELEPHONE (OUTPATIENT)
Dept: PULMONOLOGY | Facility: CLINIC | Age: 69
End: 2024-01-24
Payer: COMMERCIAL

## 2024-01-29 ENCOUNTER — TELEPHONE (OUTPATIENT)
Dept: PULMONOLOGY | Facility: CLINIC | Age: 69
End: 2024-01-29
Payer: COMMERCIAL

## 2024-01-30 ENCOUNTER — CLINICAL SUPPORT (OUTPATIENT)
Dept: PULMONOLOGY | Facility: CLINIC | Age: 69
End: 2024-01-30
Payer: COMMERCIAL

## 2024-01-30 VITALS
HEART RATE: 73 BPM | BODY MASS INDEX: 35.05 KG/M2 | HEIGHT: 60 IN | WEIGHT: 178.56 LBS | OXYGEN SATURATION: 97 % | RESPIRATION RATE: 16 BRPM

## 2024-01-30 DIAGNOSIS — J45.50 SEVERE PERSISTENT ASTHMA WITHOUT COMPLICATION: Primary | ICD-10-CM

## 2024-01-30 PROCEDURE — 99999 PR PBB SHADOW E&M-EST. PATIENT-LVL III: CPT | Mod: PBBFAC,,,

## 2024-01-30 NOTE — PROGRESS NOTES
Pulmonary Disease Management  [facility]  Follow up Visit  Chronic Care Management    Aminah Dockery  was seen 1/30/2024  1:30 PM in the Pul[facility] Management [facility] for evaluation, education, reinforcement of self management techniques and exacerbation action plan.    Benji Mims    Past Medical History:   Diagnosis Date    Disorder of kidney and ureter     Hyperlipidemia     Hypertension     Routine general medical examination at a health care facility 07/14/2017    Vitamin D deficiency        Patient's Medications   New Prescriptions    No medications on file   Previous Medications    ALBUTEROL (PROVENTIL/VENTOLIN HFA) 90 MCG/ACTUATION INHALER    INHALE 2 PUFFS INTO THE LUNGS EVERY 4 HOURS AS NEEDED FOR WHEEZING OR RESCUE    AMLODIPINE (NORVASC) 10 MG TABLET    TAKE 1 TABLET(10 MG) BY MOUTH EVERY DAY    ATORVASTATIN (LIPITOR) 80 MG TABLET    Take 1 tablet (80 mg total) by mouth once daily.    BENZONATATE (TESSALON) 200 MG CAPSULE    Take 200 capsules by mouth every 8 (eight) hours as needed.    BUDESONIDE 1 MG/2 ML NBSP    USE 1 VIAL VIA NEBULIZER DAILY    CHOLECALCIFEROL, VITAMIN D3, 1,250 MCG (50,000 UNIT) CAPSULE    TAKE 1 CAPSULE BY MOUTH ONE TIME PER WEEK    DOXAZOSIN (CARDURA) 4 MG TABLET    Take 1 tablet (4 mg total) by mouth every evening.    FLUTICASONE-SALMETEROL DISKUS INHALER 100-50 MCG    Inhale 1 puff into the lungs 2 (two) times daily. Controller    IBUPROFEN (ADVIL,MOTRIN) 800 MG TABLET    TAKE 1 TABLET BY MOUTH EVERY 6 TO 8 HOURS WITH FOOD AS NEEDED FOR PAIN (MAX 4 TABS/DAY)    LISINOPRIL (PRINIVIL,ZESTRIL) 40 MG TABLET    Take 1 tablet (40 mg total) by mouth 2 (two) times daily.    METOPROLOL SUCCINATE (TOPROL-XL) 100 MG 24 HR TABLET    TAKE 1 AND 1/2 TABLETS BY MOUTH ONCE DAILY   Modified Medications    No medications on file   Discontinued Medications    No medications on file             Educational assessment:   [x]            Good  []            Fair  []             Poor    Readiness to learn:   [x]            Good  []            Fair  []            Poor    Vision Status:   [x]            Good  []            Fair  []            Poor    Reading Ability:  [x]            Good  []            Fair  []            Poor    Knowledge of condition:   [x]            Good  []            Fair  []            Poor    Language Barriers:   []            Good  []            Fair  []            Poor  [x]            None    Cognitive/ Physical Barriers:   []            Good  []            Fair  []            Poor  [x]            None    Learning best by:                       [x]            Seeing  []            Hearing  []            Reading                         [x]            Doing    Describe any barrier /Limitation or financial implications of care choices identified     []            Financial  []            Emotional  []            Education  []            Vision/Hearing  []            Physical  [x]            None  []                TOPIC /CONTENT FOR TODAY:    [x]            MDI with or without spacer  [x]            Dry powder inhaler  []            Acapella   []           Peak Flow meter  [x]            Action plan  [x]            Nebulizer use  []            Oxygen use safety  []            Breathing and cough techniques  []            Energy conservation  [x]            Infection prevention  []           OTHER________________________        Learner:    [x]            Patient   []            Caregiver    Method:    [x]            Verbal explanation  [x]            Audio visual    [x]            Literature  [x]            Teach back      Evaluation:    [x]            Teach back  [x]            Demonstrate  [x]            Follow up phone call    []            2 weeks     [x]            4 weeks   [x]            PRN    Additional comments:   Patient was seen today to review respiratory medication purpose and proper technique for use of inhalers. Patient practiced proper technique using  MDI with valved holding chamber (spacer) and DPI inhalers. Patient demonstrated understanding. Literature was given to patient.     Asthma trigger checklist was verbally reviewed and literature given to patient.     Infection prevention was discussed. Patient was reminded to get RSV vaccine. Hand hygiene and cleaning of respiratory equipment was also discussed. Patient verbalized understanding.      Asthma action plan was reviewed and literature was given to patient. Patient verbalized understanding.     Plan:  Monthly Pulmonary Disease Management Questionnaire  Follow-up PDM appointment scheduled for 8/1/24  Reinforce education  Meds: [place], albuterol   DME Needs: [facility]   Action Plan: Asthma   Immunization: Pneumococcal- current, Flu-current , Covid 19- current   Next Provider Visit: 10/18/24  Next Spirometry/CPFT: 10/18/24  Approximate time spent with patient: 30 mins

## 2024-01-30 NOTE — PATIENT INSTRUCTIONS
Understanding Asthma         Asthma is a long-term (chronic) lung condition. It involves the airways (bronchial tubes). It happens when a trigger causes your airways to swell and become narrow. The muscles around your airways start to tighten. When your airways start to narrow, air can't move in and out of your lungs very well. Mucus also builds up along the airways. This makes it even harder to move air in and out of your lungs.  Experts are not exactly sure what causes asthma. It may be caused by a mix of inherited and environmental factors. People with asthma may have no symptoms until they are exposed to an allergen or trigger.  Healthy lungs  Inside your lungs there are branching airways made of stretchy tissue. Each airway is wrapped with bands of muscle. The airways are smaller as they go deeper into the lungs. The smallest airways end in clusters of tiny balloon-like air sacs (alveoli). These clusters are surrounded by blood vessels. When you breathe in (inhale), air enters the lungs. It travels down through the airways until it reaches the air sacs. When you breathe out (exhale), air travels up through the airways and out of the lungs. The airways make mucus that traps particles you breathe in. Normally, the mucus is then swept out of the lungs by tiny hairs (cilia) that line the airways. The mucus is swallowed or coughed up during your day.    What the lungs do  The air you inhale contains oxygen. When oxygen reaches the air sacs, it passes into the blood vessels around the sacs. Your blood then sends oxygen to all of your cells. As you exhale, carbon dioxide is removed in a similar way from the blood in the air sacs, and from your body.    When you have asthma  People with asthma have very sensitive airways. This means the airways react to certain things called triggers. Triggers can include pollen, dust, or smoke. Triggers cause inflammation. This makes the airways swell and become narrow. This is a  long-lasting (chronic) problem. Your airways may not always be narrow enough so that you notice breathing problems.  Symptoms of chronic inflammation include:   Coughing (chronic)   A feeling of tightness in your chest   Feeling short of breath   Wheezing (a whistling noise, especially when breathing out)   Low energy or feeling tired   In some people, over time chronic mild inflammation can lead to lasting (permanent) scarring of airways and loss of lung function.    Asthma flare-ups  When sensitive airways are irritated by a trigger, the muscles around the airways tighten. The lining of the airways swells. Thick, sticky mucus increases and partly clogs the airways. All of this makes it harder to breathe.  Symptoms of flare-ups may include:  Coughing, especially at night. You may not be able to sleep because of coughing.   Getting tired or out of breath easily   Wheezing   Chest tightness   Faster breathing when at rest   Flare-ups can be life-threatening. In a severe flare-up, the muscle tightening, swelling, and mucus are worse. Its very hard to breathe. Your body can't get enough oxygen and can't remove carbon dioxide. Waste gas is trapped in the alveoli. Gas exchange cant occur. The body is not getting enough oxygen. Without oxygen, body tissues, especially brain tissue, begin to get damaged. If this goes on for long, it can lead to severe brain damage or death.  Call 911 (or have someone call for you) if you have any of these symptoms and they are not relieved right away by taking your quick-relief medicine as prescribed:  Trouble breathing   Feeling too short of breath to talk or walk   Lips or fingers turning blue   Feeling lightheaded or dizzy, as though you are about to pass out   Peak flow less than 50% of your personal best, if you use a peak flow meter     Managing your asthma  Asthma is a long-term condition. So its important to work with your healthcare provider to manage it. If you have asthma,  you can prevent flare-ups. Develop an asthma action plan with your healthcare provider. It can help control your asthma and manage your symptoms. An asthma action plan also tells you and your family or friends what to do if your asthma flares up or gets worse.  Take your medicine as prescribed. Also learn about your asthma triggers. Knowing what causes your asthma to flare up in the first place can help you prevent future breathing problems.  If you smoke, get help to quit.  Asthma Trigger Checklist  Allergens, irritants, and other things may trigger your asthma. Check the box next to each of your triggers. After each trigger is a list of ways to avoid it.   Dust mites. Dust mites live in mattresses, bedding, carpets, curtains, and indoor dust.  To kill dust mites, wash bedding in hot water (130°F) each week.  Cover mattress and pillows with special dust-mite-proof cases.  Don't use upholstered furniture like sofas or chairs in the bedroom.  Use allergy-proof filters for air conditioners and furnaces. Replace or clean them as instructed.  If you can, replace carpeting with wood or tile fanta, especially in the bedroom.  Animals. Animals with fur or feathers shed dander (allergens).  It's best to choose a pet that doesn't have fur or feathers, such as a fish or a reptile.  If you have pets, keep them off your bed and out of your bedroom.  Wash your hands and clothes after handling pets.    Mold. Mold grows in damp places, such as bathrooms, basements, and closets.  Ask someone to clean damp areas in your home every week. Or try wearing a face mask while you clean.  Run an exhaust fan while bathing. Or leave a window open in the bathroom.  Repair water leaks in or around your home.  Have someone else cut grass or rake leaves, if possible.  Don't use vaporizers or humidifiers. They encourage mold growth.    Pollen. Pollen from trees, grasses, and weeds is a common allergen. (Flower pollens are generally not a  problem).  Try to learn what types of pollen affect you most. Pollen levels vary depending on the plant, the season, and the time of day.  If possible, use air conditioning instead of opening the windows in your home or car.  Have someone else do yard work, if possible.    Cockroaches. Roaches are found in many homes and produce allergens.  Keep your kitchen clean and dry. A leaky faucet or drain can attract roaches.  Remove garbage from your home daily.  Store food in tightly sealed containers. Wash dishes as soon as they are used.  Use bait stations or traps to control roaches. Avoid using chemical sprays.    Smoke. Smoke may be from cigarettes, cigars, pipes, incense or candles, barbecues or grills, and fireplaces.  Don't smoke. And don't let people smoke in your home or car.  When you travel, ask for nonsmoking rental cars and hotel rooms.  Avoid fireplaces and wood stoves. If you can't, sit away from them. Make sure the smoke is directed outside.  Don't burn incense or use candles.  Move away from smoky outdoor cooking grills.    Smog.  Smog is from car exhaust and other pollution.  Read or listen to local air-quality reports. These let you know when air quality is poor.  Stay indoors as much as you can on smoggy days. If possible, use air conditioning instead of opening the windows.  In your car, set air conditioning to recirculate air, so less pollution gets in.    Strong odors. These include air fresheners, deodorizers, and cleaning products; perfume, deodorant, and other beauty products; incense and candles; and insect sprays and other sprays.  Use scent-free products like deodorant or body lotion.  Avoid using cleaning products with bleach and ammonia. Make your own cleaning solution with white vinegar, baking soda, or mild dish soap.  Use exhaust fans while cooking. Or open a window, if possible.   Avoid perfumes, air fresheners, potpourri, and other scented products.          Other irritants. These  include dust, aerosol sprays, and powders.  Wear a face mask while doing tasks like sanding, dusting, sweeping, and yard work. Open doors and windows if working indoors.  Use pump spray bottles instead of aerosols.  Pour liquid  onto a rag or cloth instead of spraying them.    Weather. Weather conditions can trigger symptoms or make them worse.  Watch for very high or low temperatures, very humid conditions, or a lot of wind, as these conditions can make symptoms worse.  Limit outdoor activity during the type of weather that affects you.  Wear a scarf over your mouth and nose in cold weather.    Colds, flu, and sinus infections. Upper respiratory infections can trigger asthma.  Wash your hands often with soap and warm water or use a hand  containing alcohol.  Get a yearly flu shot. And ask if you should get a pneumonia vaccine.  Take care of your general health. Get plenty of sleep. And eat a variety of healthy foods.    Food additives. Food additives can trigger asthma flare-ups in some people.  Check food labels for sulfites or other similar ingredients. These are often found in foods such as wine, beer, and dried fruits.  Avoid foods that contain these additives.    Medicine. Aspirin, NSAIDS like ibuprofen and naproxen, and heart medicines like beta-blockers may be triggers.  Tell your health care provider if you think certain medicines trigger symptoms.   Be sure to read the labels on over-the-counter medicines. They may have ingredients that cause symptoms for you.   , Emotions. Laughing, crying, or feeling excited are triggers for some people.   To help you stay calm: Try breathing in slowly through your nose for a count of 2 seconds. Close your lips and breathe out for 4 seconds. Repeat.  Try to focus on a soothing image in your mind. This will help relax you and calm your breathing.  Remember to take your daily controller medicines. When you're upset or under stress, it's easy to forget.     Exercise. For some people, exercise can trigger symptoms. Don't let this keep you from being active.   If you have not been exercising regularly, start slow and work up gradually.  Take all of your medicines as prescribed.  If you use quick-relief medicine, make sure you have it with you when you exercise.  Stop if you have any symptoms. Make sure you talk with your provider about these symptoms.  © 3623-9486 [employer]. [address] [place] [address], [place] [address]. All rights reserved. This information is not intended as a substitute for professional medical care. Always follow your healthcare professional's instructions.          ACTION PLAN    GREEN ZONE  My sputum is clear/white/usual color and easily cleared.  My breathing is no harder than usual.  I can do my usual activities.  I can think clearly.   Take your usual medicines, including oxygen, as you are told to do so by your health care provider.   YELLOW ZONE  My sputum has change (color, thickness, amount).  I am more short of breath than usual.  I cough or wheeze more.  I weigh more and my legs/feet swell.  I cannot do my usual activities without resting.   Call your health care provider. You will probably be told to begin taking an antibiotic and prednisone. Have your pharmacy phone number available.   RED ZONE  I cannot cough out my sputum.  I am much more short of breath than normal.  I need to sit up to breathe  I cannot do my usual activities.  I am unable to speak more than one or two words at a time.  I am confused.   Call your health care provider. You may be asked to come in to be seen, told to go to the emergency room, or told to call 9-1-1.

## 2024-03-04 PROBLEM — E78.49 OTHER HYPERLIPIDEMIA: Status: ACTIVE | Noted: 2024-03-04

## 2024-03-04 PROBLEM — N18.32 STAGE 3B CHRONIC KIDNEY DISEASE: Status: ACTIVE | Noted: 2018-03-25

## 2024-03-04 PROBLEM — E78.5 HYPERLIPIDEMIA: Status: RESOLVED | Noted: 2018-03-01 | Resolved: 2024-03-04

## 2024-03-04 PROBLEM — J45.50 SEVERE PERSISTENT ASTHMA WITHOUT COMPLICATION: Status: ACTIVE | Noted: 2023-04-24

## 2024-05-29 ENCOUNTER — HOSPITAL ENCOUNTER (OUTPATIENT)
Dept: RADIOLOGY | Facility: HOSPITAL | Age: 69
Discharge: HOME | End: 2024-05-29
Attending: INTERNAL MEDICINE
Payer: COMMERCIAL

## 2024-05-29 DIAGNOSIS — J45.50 SEVERE PERSISTENT ASTHMA WITHOUT COMPLICATION: ICD-10-CM

## 2024-05-29 PROCEDURE — 71046 X-RAY EXAM CHEST 2 VIEWS: CPT | Mod: 26,,, | Performed by: RADIOLOGY

## 2024-05-29 PROCEDURE — 71046 X-RAY EXAM CHEST 2 VIEWS: CPT | Mod: TC

## 2024-07-31 ENCOUNTER — TELEPHONE (OUTPATIENT)
Dept: PULMONOLOGY | Facility: CLINIC | Age: 69
End: 2024-07-31
Payer: COMMERCIAL

## 2024-07-31 NOTE — TELEPHONE ENCOUNTER
Chronic Disease Management:   Called patient to confirm Pulmonary Disease Management appointment. No answer.  Voicemail full.

## 2024-08-01 ENCOUNTER — CLINICAL SUPPORT (OUTPATIENT)
Dept: PULMONOLOGY | Facility: CLINIC | Age: 69
End: 2024-08-01
Payer: COMMERCIAL

## 2024-08-01 VITALS
HEIGHT: 61 IN | OXYGEN SATURATION: 97 % | WEIGHT: 184.75 LBS | RESPIRATION RATE: 16 BRPM | HEART RATE: 71 BPM | BODY MASS INDEX: 34.88 KG/M2

## 2024-08-01 DIAGNOSIS — J45.50 SEVERE PERSISTENT ASTHMA WITHOUT COMPLICATION: Primary | ICD-10-CM

## 2024-08-01 PROCEDURE — 99999 PR PBB SHADOW E&M-EST. PATIENT-LVL III: CPT | Mod: PBBFAC,,,

## 2024-08-01 NOTE — PROGRESS NOTES
Pulmonary Disease Management  [facility]  Follow up Visit  Chronic Care Management    Aminah Dockery  was seen 8/1/2024  10:00 AM in the Pulmonary Disease Management [facility] for evaluation, education, reinforcement of self management techniques and exacerbation action plan.    Benji Mims    Past Medical History:   Diagnosis Date    Disorder of kidney and ureter     Hyperlipidemia     Hypertension     Routine general medical examination at a health care facility 07/14/2017    Vitamin D deficiency        Patient's Medications   New Prescriptions    No medications on file   Previous Medications    ALBUTEROL (PROVENTIL/VENTOLIN HFA) 90 MCG/ACTUATION INHALER    INHALE 2 PUFFS INTO THE LUNGS EVERY 4 HOURS AS NEEDED FOR WHEEZING OR RESCUE    AMLODIPINE (NORVASC) 10 MG TABLET    Take 1 tablet (10 mg total) by mouth once daily.    ATORVASTATIN (LIPITOR) 80 MG TABLET    TAKE 1 TABLET(80 MG) BY MOUTH EVERY DAY    BENZONATATE (TESSALON) 200 MG CAPSULE    Take 200 capsules by mouth every 8 (eight) hours as needed.    BUDESONIDE 1 MG/2 ML NBSP    USE 1 VIAL VIA NEBULIZER DAILY    CALCITRIOL (ROCALTROL) 0.25 MCG CAP    Take 0.25 mcg by mouth.    CHOLECALCIFEROL, VITAMIN D3, 1,250 MCG (50,000 UNIT) CAPSULE    TAKE 1 CAPSULE BY MOUTH ONE TIME PER WEEK    DOXAZOSIN (CARDURA) 4 MG TABLET    Take 1 tablet (4 mg total) by mouth every evening.    FLUTICASONE-SALMETEROL DISKUS INHALER 100-50 MCG    Inhale 1 puff into the lungs 2 (two) times daily. Controller    IBUPROFEN (ADVIL,MOTRIN) 800 MG TABLET    TAKE 1 TABLET BY MOUTH EVERY 6 TO 8 HOURS WITH FOOD AS NEEDED FOR PAIN (MAX 4 TABS/DAY)    LISINOPRIL (PRINIVIL,ZESTRIL) 40 MG TABLET    Take 1 tablet (40 mg total) by mouth 2 (two) times daily.    METOPROLOL SUCCINATE (TOPROL-XL) 100 MG 24 HR TABLET    TAKE 1 AND 1/2 TABLETS BY MOUTH ONCE DAILY    PREDNISONE (DELTASONE) 20 MG TABLET        [id number] ANTIGEN-AS01E, PF, (AREXVY, PF,) 120 MCG/0.5 ML SUSR VACCINE    Inject into  the muscle.   Modified Medications    No medications on file   Discontinued Medications    No medications on file             Educational assessment:   [x]            Good  []            Fair  []            Poor    Readiness to learn:   [x]            Good  []            Fair  []            Poor    Vision Status:   [x]            Good  []            Fair  []            Poor    Reading Ability:  [x]            Good  []            Fair  []            Poor    Knowledge of condition:   [x]            Good  []            Fair  []            Poor    Language Barriers:   []            Good  []            Fair  []            Poor  [x]            None    Cognitive/ Physical Barriers:   []            Good  []            Fair  []            Poor  [x]            None    Learning best by:                       [x]            Seeing  []            Hearing  []            Reading                         [x]            Doing    Describe any barrier /Limitation or financial implications of care choices identified     []            Financial  []            Emotional  []            Education  []            Vision/Hearing  []            Physical  [x]            None  []                TOPIC /CONTENT FOR TODAY:    [x]            MDI with or without spacer  [x]            Dry powder inhaler  []            Acapella   []           Peak Flow meter  [x]            COPD action plan  [x]            Nebulizer use  []            Oxygen use safety  []            Breathing and cough techniques  [x]            Energy conservation  [x]            Infection prevention  []           OTHER________________________        Learner:    [x]            Patient   []            Caregiver    Method:    [x]            Verbal explanation  [x]            Audio visual    [x]            Literature  [x]            Teach back      Evaluation:    [x]            Teach back  [x]            Demonstrate  [x]            Follow up phone call    []            2 weeks     [x]             4 weeks   [x]            PRN    Additional comments:   Patient was seen today to review respiratory medication purpose and proper technique for use of inhalers. Patient practiced proper technique using MDI with valved holding chamber (spacer) and DPI inhalers. Patient demonstrated understanding. Literature was given to patient.     Patient was reeducated on the importance of adhering to respiratory maintenance regimen. Patient has not been consistently utilizing Advair Diskus as prescribed. Reviewed prescribed frequency of use for Advair Diskus. Reminded patient to rinse mouth thoroughly after ICS use. Understanding was stated.      Asthma trigger checklist was verbally reviewed and literature given to patient.     Infection prevention was discussed. Patient's immunizations are current. Hand hygiene and cleaning of respiratory equipment was also discussed. Patient verbalized understanding.      Asthma action plan was reviewed and literature was given to patient. Patient verbalized understanding.     Plan:  Monthly Pulmonary Disease Management Questionnaire  Follow-up PDM appointment scheduled 1/23/25  Reinforce education  Meds: Advair Diskus, albuterol   DME Needs: [facility]   Action Plan  Immunization: Pneumococcal- current, Flu-current , Covid 19- current   Next Provider Visit: 10/18/24  Next Spirometry/CPFT: 10/18/24  Approximate time spent with patient: 30 mins

## 2024-08-22 DIAGNOSIS — J45.50 SEVERE PERSISTENT ASTHMA WITHOUT COMPLICATION: ICD-10-CM

## 2024-08-23 RX ORDER — FLUTICASONE PROPIONATE AND SALMETEROL 100; 50 UG/1; UG/1
1 POWDER RESPIRATORY (INHALATION) 2 TIMES DAILY
Qty: 60 EACH | Refills: 11 | Status: SHIPPED | OUTPATIENT
Start: 2024-08-23

## 2024-10-25 ENCOUNTER — CLINICAL SUPPORT (OUTPATIENT)
Dept: PULMONOLOGY | Facility: CLINIC | Age: 69
End: 2024-10-25
Attending: INTERNAL MEDICINE
Payer: COMMERCIAL

## 2024-10-25 ENCOUNTER — OFFICE VISIT (OUTPATIENT)
Dept: PULMONOLOGY | Facility: CLINIC | Age: 69
End: 2024-10-25
Payer: COMMERCIAL

## 2024-10-25 ENCOUNTER — TELEPHONE (OUTPATIENT)
Dept: PULMONOLOGY | Facility: CLINIC | Age: 69
End: 2024-10-25
Payer: COMMERCIAL

## 2024-10-25 ENCOUNTER — PATIENT MESSAGE (OUTPATIENT)
Dept: PULMONOLOGY | Facility: CLINIC | Age: 69
End: 2024-10-25

## 2024-10-25 ENCOUNTER — HOSPITAL ENCOUNTER (OUTPATIENT)
Dept: RADIOLOGY | Facility: HOSPITAL | Age: 69
Discharge: HOME | End: 2024-10-25
Attending: INTERNAL MEDICINE
Payer: COMMERCIAL

## 2024-10-25 VITALS
BODY MASS INDEX: 37.15 KG/M2 | SYSTOLIC BLOOD PRESSURE: 116 MMHG | OXYGEN SATURATION: 97 % | DIASTOLIC BLOOD PRESSURE: 70 MMHG | WEIGHT: 189.19 LBS | HEIGHT: 60 IN | HEART RATE: 59 BPM | RESPIRATION RATE: 19 BRPM

## 2024-10-25 DIAGNOSIS — Z87.01 HISTORY OF PNEUMONIA: Primary | ICD-10-CM

## 2024-10-25 DIAGNOSIS — Z87.01 HISTORY OF PNEUMONIA: ICD-10-CM

## 2024-10-25 DIAGNOSIS — J45.50 SEVERE PERSISTENT ASTHMA WITHOUT COMPLICATION: Primary | ICD-10-CM

## 2024-10-25 DIAGNOSIS — J45.50 SEVERE PERSISTENT ASTHMA WITHOUT COMPLICATION: ICD-10-CM

## 2024-10-25 DIAGNOSIS — I25.10 CORONARY ARTERY DISEASE, UNSPECIFIED VESSEL OR LESION TYPE, UNSPECIFIED WHETHER ANGINA PRESENT, UNSPECIFIED WHETHER NATIVE OR TRANSPLANTED HEART: ICD-10-CM

## 2024-10-25 LAB
ALLENS TEST: ABNORMAL
BRPFT: NORMAL
DELSYS: ABNORMAL
FEF 25 75 CHG: 7 %
FEF 25 75 LLN: 1.11
FEF 25 75 POST REF: 15.8 %
FEF 25 75 PRE REF: 14.8 %
FEF 25 75 REF: 2.51
FET100 CHG: 6.2 %
FEV1 CHG: 12.3 %
FEV1 FVC CHG: -0.2 %
FEV1 FVC LLN: 67
FEV1 FVC POST REF: 80.3 %
FEV1 FVC PRE REF: 80.4 %
FEV1 FVC REF: 79
FEV1 LLN: 1.18
FEV1 POST REF: 51 %
FEV1 PRE REF: 45.4 %
FEV1 REF: 1.68
FIO2: 21
FVC CHG: 12.5 %
FVC LLN: 1.52
FVC POST REF: 63.3 %
FVC PRE REF: 56.3 %
FVC REF: 2.13
HCO3 UR-SCNC: 22.2 MMOL/L (ref 24–28)
MODE: ABNORMAL
PCO2 BLDA: 36.1 MMHG (ref 35–45)
PEF CHG: 20.5 %
PEF LLN: 2.6
PEF POST REF: 72.2 %
PEF PRE REF: 59.9 %
PEF REF: 4.36
PH SMN: 7.4 [PH] (ref 7.35–7.45)
PO2 BLDA: 97 MMHG (ref 80–100)
POC BE: -3 MMOL/L
POC SATURATED O2: 98 % (ref 95–100)
POST FEF 25 75: 0.4 L/S
POST FET 100: 8.6 SEC
POST FEV1 FVC: 63.68 %
POST FEV1: 0.86 L
POST FVC: 1.35 L
POST PEF: 3.15 L/S
PRE FEF 25 75: 0.37 L/S
PRE FET 100: 8.1 SEC
PRE FEV1 FVC: 63.8 %
PRE FEV1: 0.77 L
PRE FVC: 1.2 L
PRE PEF: 2.61 L/S
SAMPLE: ABNORMAL
SITE: ABNORMAL

## 2024-10-25 PROCEDURE — 99999 PR PBB SHADOW E&M-EST. PATIENT-LVL I: CPT | Mod: PBBFAC,,,

## 2024-10-25 PROCEDURE — 71046 X-RAY EXAM CHEST 2 VIEWS: CPT | Mod: TC

## 2024-10-25 PROCEDURE — 99999 PR PBB SHADOW E&M-EST. PATIENT-LVL V: CPT | Mod: PBBFAC,,, | Performed by: INTERNAL MEDICINE

## 2024-10-25 PROCEDURE — 71046 X-RAY EXAM CHEST 2 VIEWS: CPT | Mod: 26,,, | Performed by: RADIOLOGY

## 2024-10-25 RX ORDER — FLUTICASONE FUROATE, UMECLIDINIUM BROMIDE AND VILANTEROL TRIFENATATE 200; 62.5; 25 UG/1; UG/1; UG/1
1 POWDER RESPIRATORY (INHALATION) DAILY
Qty: 60 EACH | Refills: 11 | Status: SHIPPED | OUTPATIENT
Start: 2024-10-25

## 2024-10-25 RX ORDER — ISOSORBIDE MONONITRATE 30 MG/1
30 TABLET, EXTENDED RELEASE ORAL
COMMUNITY
Start: 2024-10-25

## 2024-10-25 NOTE — PROCEDURES
ABG completed. See ABG Below.          Interpretation:  [] Arterial blood gases on room air demonstrate normal pCO2 and pO2  [] Arterial blood gases on room air are abnormal demonstrating hypercarbia (pCO2 >45 mmHg)  [] Arterial blood gases on room air are abnormal demonstrating hypocarbia (pCO2 < 35 mmHg)  [] Arterial blood gases on room air are abnormal demonstrating hypoxemia (pO2 < 80 mmHg)  [] Arterial blood gases on room air are abnormal demonstrating hyperoxemia (pO2 >120 mmHg)  [] A   Latest Reference Range & Units 10/25/24 13:48   POC PH 7.35 - 7.45  7.398   POC PCO2 35 - 45 mmHg 36.1   POC PO2 80 - 100 mmHg 97   POC HCO3 24 - 28 mmol/L 22.2 (L)   POC SATURATED O2 95 - 100 % 98   Sample  ARTERIAL   POC BE -2 to 2 mmol/L -3 (L)   FiO2  21   DelSys  Room Air   Site  RB   Mode  SPONT   (L): Data is abnormally lowrterial blood gases on room air are abnormal demonstrating hypoxemia (pO2 < 80 mmHg) and hypercarbia (pCO2>45 mmHg)    The table below summarizes the main interpretations of the relationship between the arterial blood gases, pH, pCO2 and HCO-3    []    I

## 2024-10-25 NOTE — PROGRESS NOTES
Pulmonary Outpatient  Visit     Subjective:       Patient ID: Aminah Dockery is a 69 y.o. female.    Social History     Tobacco Use   Smoking Status Never   Smokeless Tobacco Never            Chief Complaint: Shortness of Breath      Aminah Dockery is 68 y.o.  Referred by Susanne Gimenez MD   Hx COVID x 3  Recently Bronchitis: CXR : April 2023  Cough syrup, Abx,   Walk with SOB, [facility] ER, CXR, EKG  Prednisone and Abx  May 12th: went to ER: Steriods given  Felt better  Wheezing improved  PCP gave: prednisone and abx;Levaquin  Denies fever  Works at [employer]  Has Inhaler, tessalon, Nebulizer with Budesonide.  Brother Had asthma    CXR reviewed: normal    07/7/2023  Followup to review tests  Feels much better  FeNo12  Cough, SOB resolved  However FEV1 54.6%  Added TRELEGY  Reduced FVC and FEV1 raise concern for ILD  Hx COVID and 2nd hand tobacco exposure  6MWD: normal capacity: no desturation    10/20/2023  Follow up  Last seen 07/07/2023  Cough and SOB resolved  ACT score 20  Stable Advair and KJ  Echo results pending: PA pressure 38  CT chest reveiwed 3 small nodules not requiring follow-up    10/25/2024  Followup  Asked to see Dr Leah WILSON  Had LHC  Was abn: LAD lesion  WILSON walking parking to building  No cough  Recently came off PO Prednisone, weaned by PCP  ACT score 20  FeNo 13 PPB  FEV1 0.77L( 45.4%)              Review of Systems   HENT:  Negative for postnasal drip.    Respiratory:  Negative for snoring and cough.    All other systems reviewed and are negative.      Outpatient Encounter Medications as of 10/25/2024   Medication Sig Dispense Refill    isosorbide mononitrate (IMDUR) 30 MG 24 hr tablet Take 30 mg by mouth.      albuterol (PROVENTIL/VENTOLIN HFA) 90 mcg/actuation inhaler INHALE 2 PUFFS INTO THE LUNGS EVERY 4 HOURS AS NEEDED FOR WHEEZING OR RESCUE 6.7 g 5    amLODIPine (NORVASC) 10 MG tablet Take 1 tablet (10 mg total) by mouth once daily.  90 tablet 1    atorvastatin (LIPITOR) 80 MG tablet TAKE 1 TABLET(80 MG) BY MOUTH EVERY DAY 90 tablet 1    benzonatate (TESSALON) 200 MG capsule Take 200 capsules by mouth every 8 (eight) hours as needed. (Patient not taking: Reported on 9/25/2024)      calcitRIOL (ROCALTROL) 0.25 MCG Cap Take 0.25 mcg by mouth.      cholecalciferol, vitamin D3, 1,250 mcg (50,000 unit) capsule TAKE 1 CAPSULE BY MOUTH ONE TIME PER WEEK 15 capsule 1    doxazosin (CARDURA) 4 MG tablet Take 1 tablet (4 mg total) by mouth every evening. 90 tablet 1    fluticasone-umeclidin-vilanter (TRELEGY ELLIPTA) 200-62.5-25 mcg inhaler Inhale 1 puff into the lungs once daily. 60 each 11    ibuprofen (ADVIL,MOTRIN) 800 MG tablet  (Patient not taking: Reported on 9/25/2024)      JARDIANCE 10 mg tablet Take 10 mg by mouth once daily.      lisinopriL (PRINIVIL,ZESTRIL) 20 MG tablet Take 20 mg by mouth.      lisinopriL (PRINIVIL,ZESTRIL) 40 MG tablet Take 1 tablet (40 mg total) by mouth 2 (two) times daily. 90 tablet 1    metoprolol succinate (TOPROL-XL) 100 MG 24 hr tablet TAKE 1 AND 1/2 TABLETS BY MOUTH ONCE DAILY 135 tablet 1    predniSONE (DELTASONE) 5 MG tablet 3 tabs poqd x 5 days then two tabspoqd 7 days then one tab poqd x 7days then 1/2 tabpoqd x 7days then 1/2 tab every other day for 7days then stop 30 tablet 0    RSVPreF3 antigen-AS01E, PF, (AREXVY, PF,) 120 mcg/0.5 mL SusR vaccine Inject into the muscle. 0.5 mL 0    spironolactone (ALDACTONE) 25 MG tablet Take 25 mg by mouth once daily.      [DISCONTINUED] fluticasone-salmeterol 100-50 mcg/dose (WIXELA INHUB) 100-50 mcg/dose diskus inhaler INHALE 1 PUFF INTO THE LUNGS TWICE DAILY 60 each 11    [DISCONTINUED] fluticasone-salmeterol diskus inhaler 100-50 mcg 1 (one) time each day       No facility-administered encounter medications on file as of 10/25/2024.       The following portions of the patient's history were reviewed and updated as appropriate: She  has a past medical history of Disorder  of kidney and ureter, Hyperlipidemia, Hypertension, Routine general medical examination at a health care facility (07/14/2017), and Vitamin D deficiency.  She does not have any pertinent problems on file.  She  has a past surgical history that includes Hysterectomy.  Her family history includes Cancer in her mother; Hypertension in her father; Stroke in her father.  She  reports that she has never smoked. She has never used smokeless tobacco. She reports current alcohol use. She reports that she does not use drugs.  She has a current medication list which includes the following prescription(s): isosorbide mononitrate, albuterol, amlodipine, atorvastatin, benzonatate, calcitriol, cholecalciferol (vitamin d3), doxazosin, trelegy ellipta, ibuprofen, jardiance, lisinopril, lisinopril, metoprolol succinate, prednisone, arexvy (pf), and spironolactone.  Current Outpatient Medications on File Prior to Visit   Medication Sig Dispense Refill    isosorbide mononitrate (IMDUR) 30 MG 24 hr tablet Take 30 mg by mouth.      albuterol (PROVENTIL/VENTOLIN HFA) 90 mcg/actuation inhaler INHALE 2 PUFFS INTO THE LUNGS EVERY 4 HOURS AS NEEDED FOR WHEEZING OR RESCUE 6.7 g 5    amLODIPine (NORVASC) 10 MG tablet Take 1 tablet (10 mg total) by mouth once daily. 90 tablet 1    atorvastatin (LIPITOR) 80 MG tablet TAKE 1 TABLET(80 MG) BY MOUTH EVERY DAY 90 tablet 1    benzonatate (TESSALON) 200 MG capsule Take 200 capsules by mouth every 8 (eight) hours as needed. (Patient not taking: Reported on 9/25/2024)      calcitRIOL (ROCALTROL) 0.25 MCG Cap Take 0.25 mcg by mouth.      cholecalciferol, vitamin D3, 1,250 mcg (50,000 unit) capsule TAKE 1 CAPSULE BY MOUTH ONE TIME PER WEEK 15 capsule 1    doxazosin (CARDURA) 4 MG tablet Take 1 tablet (4 mg total) by mouth every evening. 90 tablet 1    ibuprofen (ADVIL,MOTRIN) 800 MG tablet  (Patient not taking: Reported on 9/25/2024)      JARDIANCE 10 mg tablet Take 10 mg by mouth once daily.       lisinopriL (PRINIVIL,ZESTRIL) 20 MG tablet Take 20 mg by mouth.      lisinopriL (PRINIVIL,ZESTRIL) 40 MG tablet Take 1 tablet (40 mg total) by mouth 2 (two) times daily. 90 tablet 1    metoprolol succinate (TOPROL-XL) 100 MG 24 hr tablet TAKE 1 AND 1/2 TABLETS BY MOUTH ONCE DAILY 135 tablet 1    predniSONE (DELTASONE) 5 MG tablet 3 tabs poqd x 5 days then two tabspoqd 7 days then one tab poqd x 7days then 1/2 tabpoqd x 7days then 1/2 tab every other day for 7days then stop 30 tablet 0    RSVPreF3 antigen-AS01E, PF, (AREXVY, PF,) 120 mcg/0.5 mL SusR vaccine Inject into the muscle. 0.5 mL 0    spironolactone (ALDACTONE) 25 MG tablet Take 25 mg by mouth once daily.      [DISCONTINUED] fluticasone-salmeterol 100-50 mcg/dose (WIXELA INHUB) 100-50 mcg/dose diskus inhaler INHALE 1 PUFF INTO THE LUNGS TWICE DAILY 60 each 11    [DISCONTINUED] fluticasone-salmeterol diskus inhaler 100-50 mcg 1 (one) time each day       No current facility-administered medications on file prior to visit.     She has No Known Allergies..      BP Readings from Last 3 Encounters:   10/25/24 116/70   09/25/24 120/86   09/09/24 126/86        MMRC Dyspnea Scale (4 is worst)     [x] MMRC 0: Dyspneic on strenuous excercise (0 points)    [] MMRC 1: Dyspneic on walking a slight hill (0 points)    [] MMRC 2: Dyspneic on walking level ground; must stop occasionally due to breathlessness (1 point)    [] MMRC 3: Must stop for breathlessness after walking 100 yards or after a few minutes (2 points)    [] MMRC 4: Cannot leave house; breathless on dressing/undressing (3 points)       Asthma Control Test  In the past 4  weeks, how much of the time did your asthma keep you from getting as much done at work, school or at home?: None of the time  During the past 4 weeks, how often have you had shortness of breath?: More than once a day  During the past 4 weeks, how often did your asthma symptoms (wheezing, couging, shortness of breath, chest tightness or pain)  wake you up at night or earlier than usual in the morning?: Not at all  During the past 4 weeks, how often have you used your rescue inhaler or nebulizer medication (such as albuterol)?: Not at all  How would you rate your asthma control during the past 4 weeks?: Well controlled  If your score is 19 or less, your asthma may not be under control: 20    Asthma Control Test  In the past 4  weeks, how much of the time did your asthma keep you from getting as much done at work, school or at home?: None of the time  During the past 4 weeks, how often have you had shortness of breath?: More than once a day  During the past 4 weeks, how often did your asthma symptoms (wheezing, couging, shortness of breath, chest tightness or pain) wake you up at night or earlier than usual in the morning?: Not at all  During the past 4 weeks, how often have you used your rescue inhaler or nebulizer medication (such as albuterol)?: Not at all  How would you rate your asthma control during the past 4 weeks?: Well controlled  If your score is 19 or less, your asthma may not be under control: 20           No flowsheet data found.              Objective:     Vital Signs (Most Recent)  Vital Signs  Pulse: (!) 59  Resp: 19  SpO2: 97 %  BP: 116/70  Pain Score: 0-No pain  Height and Weight  Height: 5' (152.4 cm)  Weight: 85.8 kg (189 lb 3.2 oz)  BSA (Calculated - sq m): 1.91 sq meters  BMI (Calculated): 37  Weight in (lb) to have BMI = 25: 127.7]  Wt Readings from Last 2 Encounters:   10/25/24 85.8 kg (189 lb 3.2 oz)   09/25/24 83 kg (183 lb)       Physical Exam  Vitals and nursing note reviewed.   Constitutional:       Appearance: She is normal weight.   HENT:      Head: Normocephalic and atraumatic.      Nose: Nose normal.   Eyes:      Pupils: Pupils are equal, round, and reactive to light.   Cardiovascular:      Rate and Rhythm: Normal rate and regular rhythm.      Pulses: Normal pulses.      Heart sounds: Normal heart sounds.   Pulmonary:       "Effort: Pulmonary effort is normal.      Breath sounds: Normal breath sounds.   Abdominal:      General: Bowel sounds are normal.      Palpations: Abdomen is soft.   Musculoskeletal:      Cervical back: Normal range of motion.   Skin:     General: Skin is warm.   Neurological:      General: No focal deficit present.      Mental Status: She is alert and oriented to person, place, and time.   Psychiatric:         Mood and Affect: Mood normal.          Laboratory  Lab Results   Component Value Date    WBC 8.2 08/30/2024    RBC 3.43 (L) 08/30/2024    HGB 9.7 (L) 08/30/2024    HCT 30.7 (L) 08/30/2024    MCV 90 08/30/2024    MCH 28.3 08/30/2024    MCHC 31.6 08/30/2024    RDW 13.4 08/30/2024     08/30/2024    LYMPH 2.0 08/30/2024    MONO 9 08/30/2024    MONO 0.8 08/30/2024    EOS 0.1 08/30/2024    BASO 0.0 08/30/2024    EOSINOPHIL 1 08/30/2024    BASOPHIL 1 08/30/2024       BMP  Lab Results   Component Value Date     (H) 08/30/2024    K 4.2 08/30/2024     08/30/2024    CO2 25 08/30/2024    BUN 24 08/30/2024    CREATININE 2.04 (H) 08/30/2024    CALCIUM 9.2 08/30/2024    EGFRNONAA 37 (L) 01/06/2022    AST 16 08/30/2024    ALT 13 08/30/2024    PROT 7.2 02/19/2020          Lab Results   Component Value Date    IGE 1142 (H) 06/12/2023        No results found for: "ASPERGILLUS"  No results found for: "AFUMIGATUSCL"     No results found for: "ACE"     Diagnostic Results:  I have personally reviewed today the following studies:    X-Ray Chest PA And Lateral  Narrative: EXAMINATION:  XR CHEST PA AND LATERAL    CLINICAL HISTORY:  Severe persistent asthma, uncomplicated    TECHNIQUE:  PA and lateral views of the chest were performed.    COMPARISON:  05/29/2024    FINDINGS:  The lungs are clear and free of infiltrate.  No pleural effusion or pneumothorax. The heart is enlarged.  There is tortuosity of the descending thoracic aorta.  Impression: 1.  No acute cardiopulmonary process.    Electronically signed " by: Jax Yan, DO  Date:    10/25/2024  Time:    13:58          FEV1: 0.77L( 45.4%), FVC 1.20L( 56.3%)  FEV1/FVC 64       Recent Labs     10/25/24  1348   PH 7.398   PCO2 36.1   PO2 97   HCO3 22.2*   POCSATURATED 98   BE -3*        FEnO 13 PPB  Assessment/Plan:     Problem List Items Addressed This Visit       Severe persistent asthma without complication - Primary     Asthma Control Test  In the past 4  weeks, how much of the time did your asthma keep you from getting as much done at work, school or at home?: None of the time  During the past 4 weeks, how often have you had shortness of breath?: More than once a day  During the past 4 weeks, how often did your asthma symptoms (wheezing, couging, shortness of breath, chest tightness or pain) wake you up at night or earlier than usual in the morning?: Not at all  During the past 4 weeks, how often have you used your rescue inhaler or nebulizer medication (such as albuterol)?: Not at all  How would you rate your asthma control during the past 4 weeks?: Well controlled  If your score is 19 or less, your asthma may not be under control: 20      Patient recently came off long dose of chronic prednisone    Spirometry reviewed   Severe restrictive defect with robust bronchodilator response   We will change Wixela toTrelegy Ellipta   Close follow-up         Relevant Medications    fluticasone-umeclidin-vilanter (TRELEGY ELLIPTA) 200-62.5-25 mcg inhaler    Other Relevant Orders    Spirometry without Bronchodilator    CAD (coronary artery disease)     Seen Dr. Obie Real   Intervention procedure anticipated             Medication improvement from John Nelson  Close follow-up   Spacer given  Her robust bronchodilator response suggests that her asthma may not be well optimized currently  We will review in 6 weeks       Follow up in about 4 weeks (around 11/22/2024), or josh, trelegy.    This note was prepared using voice recognition system and is likely to have  sound alike errors that may have been overlooked even after proof reading.  Please call me with any questions    Discussed diagnosis, its evaluation, treatment and usual course. All questions answered.    Thank you for the courtesy of participating in the care of this patient    Edwar Mathews MD      Personal Diagnostic Review  [x]  CXR    []  ECHO    []  ONSAT    []  6MWD    []  LABS    []  CHEST CT    []  PET CT    []  Biopsy results

## 2024-10-25 NOTE — ASSESSMENT & PLAN NOTE
Asthma Control Test  In the past 4  weeks, how much of the time did your asthma keep you from getting as much done at work, school or at home?: None of the time  During the past 4 weeks, how often have you had shortness of breath?: More than once a day  During the past 4 weeks, how often did your asthma symptoms (wheezing, couging, shortness of breath, chest tightness or pain) wake you up at night or earlier than usual in the morning?: Not at all  During the past 4 weeks, how often have you used your rescue inhaler or nebulizer medication (such as albuterol)?: Not at all  How would you rate your asthma control during the past 4 weeks?: Well controlled  If your score is 19 or less, your asthma may not be under control: 20      Patient recently came off long dose of chronic prednisone    Spirometry reviewed   Severe restrictive defect with robust bronchodilator response   We will change Wixela toTrelegy Ellipta   Close follow-up

## 2024-10-28 ENCOUNTER — PATIENT MESSAGE (OUTPATIENT)
Dept: PULMONOLOGY | Facility: CLINIC | Age: 69
End: 2024-10-28
Payer: COMMERCIAL

## 2024-11-21 ENCOUNTER — CLINICAL SUPPORT (OUTPATIENT)
Dept: PULMONOLOGY | Facility: CLINIC | Age: 69
End: 2024-11-21
Payer: COMMERCIAL

## 2024-11-21 DIAGNOSIS — J45.50 SEVERE PERSISTENT ASTHMA WITHOUT COMPLICATION: ICD-10-CM

## 2024-11-21 LAB
BRPFT: ABNORMAL
FEF 25 75 LLN: 1.11
FEF 25 75 PRE REF: 26.5 %
FEF 25 75 REF: 2.51
FEV1 FVC LLN: 66
FEV1 FVC PRE REF: 90.9 %
FEV1 FVC REF: 79
FEV1 LLN: 1.19
FEV1 PRE REF: 64.2 %
FEV1 REF: 1.69
FVC LLN: 1.53
FVC PRE REF: 70.4 %
FVC REF: 2.14
PEF LLN: 2.62
PEF PRE REF: 85.7 %
PEF REF: 4.38
PRE FEF 25 75: 0.67 L/S (ref 1.11–3.91)
PRE FET 100: 8.62 SEC
PRE FEV1 FVC: 72.08 % (ref 66.48–90.34)
PRE FEV1: 1.09 L (ref 1.19–2.17)
PRE FVC: 1.51 L (ref 1.53–2.79)
PRE PEF: 3.75 L/S (ref 2.62–6.14)

## 2024-11-22 ENCOUNTER — OFFICE VISIT (OUTPATIENT)
Dept: PULMONOLOGY | Facility: CLINIC | Age: 69
End: 2024-11-22
Payer: COMMERCIAL

## 2024-11-22 VITALS
BODY MASS INDEX: 37.01 KG/M2 | OXYGEN SATURATION: 98 % | HEIGHT: 60 IN | SYSTOLIC BLOOD PRESSURE: 130 MMHG | DIASTOLIC BLOOD PRESSURE: 78 MMHG | RESPIRATION RATE: 23 BRPM | WEIGHT: 188.5 LBS | HEART RATE: 80 BPM

## 2024-11-22 DIAGNOSIS — Z87.01 HISTORY OF PNEUMONIA: ICD-10-CM

## 2024-11-22 DIAGNOSIS — J45.50 SEVERE PERSISTENT ASTHMA WITHOUT COMPLICATION: Primary | ICD-10-CM

## 2024-11-22 DIAGNOSIS — E78.49 OTHER HYPERLIPIDEMIA: ICD-10-CM

## 2024-11-22 DIAGNOSIS — I10 ESSENTIAL HYPERTENSION, BENIGN: ICD-10-CM

## 2024-11-22 PROCEDURE — 99999 PR PBB SHADOW E&M-EST. PATIENT-LVL V: CPT | Mod: PBBFAC,,, | Performed by: INTERNAL MEDICINE

## 2024-11-22 NOTE — PROGRESS NOTES
Pulmonary Outpatient  Visit     Subjective:       Patient ID: Aminah Dockery is a 69 y.o. female.    Social History     Tobacco Use   Smoking Status Never   Smokeless Tobacco Never        Asthma Management Calculator: 12 Years of Age and Older- MPR ([email or web address].[email or web address])     Persistent Moderate. Step 3       Chief Complaint: Asthma      Aminah Dockery is 68 y.o.  Referred by Susanne iGmenez MD   Hx COVID x 3  Recently Bronchitis: CXR : April 2023  Cough syrup, Abx,   Walk with SOB, [facility] ER, CXR, EKG  Prednisone and Abx  May 12th: went to ER: Steriods given  Felt better  Wheezing improved  PCP gave: prednisone and abx;Levaquin  Denies fever  Works at [employer]etary  Has Inhaler, tessalon, Nebulizer with Budesonide.  Brother Had asthma    CXR reviewed: normal    07/7/2023  Followup to review tests  Feels much better  FeNo12  Cough, SOB resolved  However FEV1 54.6%  Added TRELEGY  Reduced FVC and FEV1 raise concern for ILD  Hx COVID and 2nd hand tobacco exposure  6MWD: normal capacity: no desturation    10/20/2023  Follow up  Last seen 07/07/2023  Cough and SOB resolved  ACT score 20  Stable Advair and KJ  Echo results pending: PA pressure 38  CT chest reveiwed 3 small nodules not requiring follow-up    10/25/2024  Followup  Asked to see Dr Leah WILSON  Had [facility]  Was abn: LAD lesion  WILSON walking parking to building  No cough  Recently came off PO Prednisone, weaned by PCP  ACT score 20  FeNo 13 PPB  FEV1 0.77L( 45.4%)    11/22/2024  Followup  Impproved  FEV1 and FVC improved  Off steriods  On TRELEGY  No cough, No wheezing  reassurance              Review of Systems   HENT:  Negative for postnasal drip.    Respiratory:  Negative for snoring, cough, shortness of breath, asthma nighttime symptoms, dyspnea on extertion and use of rescue inhaler.    All other systems reviewed and are negative.      Outpatient Encounter Medications as of 11/22/2024   Medication Sig Dispense Refill     albuterol (PROVENTIL/VENTOLIN HFA) 90 mcg/actuation inhaler INHALE 2 PUFFS INTO THE LUNGS EVERY 4 HOURS AS NEEDED FOR WHEEZING OR RESCUE 6.7 g 5    amLODIPine (NORVASC) 10 MG tablet Take 1 tablet (10 mg total) by mouth once daily. 90 tablet 1    atorvastatin (LIPITOR) 80 MG tablet TAKE 1 TABLET(80 MG) BY MOUTH EVERY DAY 90 tablet 1    benzonatate (TESSALON) 200 MG capsule Take 200 capsules by mouth every 8 (eight) hours as needed.      calcitRIOL (ROCALTROL) 0.25 MCG Cap Take 0.25 mcg by mouth.      cholecalciferol, vitamin D3, 1,250 mcg (50,000 unit) capsule TAKE 1 CAPSULE BY MOUTH ONE TIME PER WEEK 15 capsule 1    doxazosin (CARDURA) 4 MG tablet Take 1 tablet (4 mg total) by mouth every evening. 90 tablet 1    fluticasone-umeclidin-vilanter (TRELEGY ELLIPTA) 200-62.5-25 mcg inhaler Inhale 1 puff into the lungs once daily. 60 each 11    isosorbide mononitrate (IMDUR) 30 MG 24 hr tablet Take 30 mg by mouth.      JARDIANCE 10 mg tablet Take 10 mg by mouth once daily.      metoprolol succinate (TOPROL-XL) 100 MG 24 hr tablet TAKE 1 AND 1/2 TABLETS BY MOUTH ONCE DAILY 135 tablet 1    RSVPreF3 antigen-AS01E, PF, (AREXVY, PF,) 120 mcg/0.5 mL SusR vaccine Inject into the muscle. 0.5 mL 0    spironolactone (ALDACTONE) 25 MG tablet Take 25 mg by mouth once daily.      [DISCONTINUED] ibuprofen (ADVIL,MOTRIN) 800 MG tablet  (Patient not taking: Reported on 9/25/2024)      [DISCONTINUED] lisinopriL (PRINIVIL,ZESTRIL) 20 MG tablet Take 20 mg by mouth.      [DISCONTINUED] lisinopriL (PRINIVIL,ZESTRIL) 40 MG tablet Take 1 tablet (40 mg total) by mouth 2 (two) times daily. 90 tablet 1    [DISCONTINUED] predniSONE (DELTASONE) 5 MG tablet 3 tabs poqd x 5 days then two tabspoqd 7 days then one tab poqd x 7days then 1/2 tabpoqd x 7days then 1/2 tab every other day for 7days then stop 30 tablet 0     No facility-administered encounter medications on file as of 11/22/2024.       The following portions of the patient's history were  reviewed and updated as appropriate: She  has a past medical history of Disorder of kidney and ureter, Hyperlipidemia, Hypertension, Routine general medical examination at a health care facility (07/14/2017), and Vitamin D deficiency.  She does not have any pertinent problems on file.  She  has a past surgical history that includes Hysterectomy.  Her family history includes Cancer in her mother; Hypertension in her father; Stroke in her father.  She  reports that she has never smoked. She has never used smokeless tobacco. She reports current alcohol use. She reports that she does not use drugs.  She has a current medication list which includes the following prescription(s): albuterol, amlodipine, atorvastatin, benzonatate, calcitriol, cholecalciferol (vitamin d3), doxazosin, trelegy ellipta, isosorbide mononitrate, jardiance, metoprolol succinate, arexvy (pf), and spironolactone.  Current Outpatient Medications on File Prior to Visit   Medication Sig Dispense Refill    albuterol (PROVENTIL/VENTOLIN HFA) 90 mcg/actuation inhaler INHALE 2 PUFFS INTO THE LUNGS EVERY 4 HOURS AS NEEDED FOR WHEEZING OR RESCUE 6.7 g 5    amLODIPine (NORVASC) 10 MG tablet Take 1 tablet (10 mg total) by mouth once daily. 90 tablet 1    atorvastatin (LIPITOR) 80 MG tablet TAKE 1 TABLET(80 MG) BY MOUTH EVERY DAY 90 tablet 1    benzonatate (TESSALON) 200 MG capsule Take 200 capsules by mouth every 8 (eight) hours as needed.      calcitRIOL (ROCALTROL) 0.25 MCG Cap Take 0.25 mcg by mouth.      cholecalciferol, vitamin D3, 1,250 mcg (50,000 unit) capsule TAKE 1 CAPSULE BY MOUTH ONE TIME PER WEEK 15 capsule 1    doxazosin (CARDURA) 4 MG tablet Take 1 tablet (4 mg total) by mouth every evening. 90 tablet 1    fluticasone-umeclidin-vilanter (TRELEGY ELLIPTA) 200-62.5-25 mcg inhaler Inhale 1 puff into the lungs once daily. 60 each 11    isosorbide mononitrate (IMDUR) 30 MG 24 hr tablet Take 30 mg by mouth.      JARDIANCE 10 mg tablet Take 10 mg by  mouth once daily.      metoprolol succinate (TOPROL-XL) 100 MG 24 hr tablet TAKE 1 AND 1/2 TABLETS BY MOUTH ONCE DAILY 135 tablet 1    RSVPreF3 antigen-AS01E, PF, (AREXVY, PF,) 120 mcg/0.5 mL SusR vaccine Inject into the muscle. 0.5 mL 0    spironolactone (ALDACTONE) 25 MG tablet Take 25 mg by mouth once daily.      [DISCONTINUED] ibuprofen (ADVIL,MOTRIN) 800 MG tablet  (Patient not taking: Reported on 9/25/2024)      [DISCONTINUED] lisinopriL (PRINIVIL,ZESTRIL) 20 MG tablet Take 20 mg by mouth.      [DISCONTINUED] lisinopriL (PRINIVIL,ZESTRIL) 40 MG tablet Take 1 tablet (40 mg total) by mouth 2 (two) times daily. 90 tablet 1    [DISCONTINUED] predniSONE (DELTASONE) 5 MG tablet 3 tabs poqd x 5 days then two tabspoqd 7 days then one tab poqd x 7days then 1/2 tabpoqd x 7days then 1/2 tab every other day for 7days then stop 30 tablet 0     No current facility-administered medications on file prior to visit.     She has No Known Allergies..      BP Readings from Last 3 Encounters:   11/22/24 130/78   10/25/24 116/70   09/25/24 120/86        MMRC Dyspnea Scale (4 is worst)     [x] MMRC 0: Dyspneic on strenuous excercise (0 points)    [] MMRC 1: Dyspneic on walking a slight hill (0 points)    [] MMRC 2: Dyspneic on walking level ground; must stop occasionally due to breathlessness (1 point)    [] MMRC 3: Must stop for breathlessness after walking 100 yards or after a few minutes (2 points)    [] MMRC 4: Cannot leave house; breathless on dressing/undressing (3 points)       Asthma Control Test  In the past 4  weeks, how much of the time did your asthma keep you from getting as much done at work, school or at home?: None of the time  During the past 4 weeks, how often have you had shortness of breath?: Once or twice a week  During the past 4 weeks, how often did your asthma symptoms (wheezing, couging, shortness of breath, chest tightness or pain) wake you up at night or earlier than usual in the morning?: Not at  all  During the past 4 weeks, how often have you used your rescue inhaler or nebulizer medication (such as albuterol)?: Once a week or less  How would you rate your asthma control during the past 4 weeks?: Somewhat controlled  If your score is 19 or less, your asthma may not be under control: 21    Asthma Control Test  In the past 4  weeks, how much of the time did your asthma keep you from getting as much done at work, school or at home?: None of the time  During the past 4 weeks, how often have you had shortness of breath?: Once or twice a week  During the past 4 weeks, how often did your asthma symptoms (wheezing, couging, shortness of breath, chest tightness or pain) wake you up at night or earlier than usual in the morning?: Not at all  During the past 4 weeks, how often have you used your rescue inhaler or nebulizer medication (such as albuterol)?: Once a week or less  How would you rate your asthma control during the past 4 weeks?: Somewhat controlled  If your score is 19 or less, your asthma may not be under control: 21           No flowsheet data found.              Objective:     Vital Signs (Most Recent)  Vital Signs  Pulse: 80  Resp: (!) 23  SpO2: 98 %  BP: 130/78  Pain Score: 0-No pain  Height and Weight  Height: 5' (152.4 cm)  Weight: 85.5 kg (188 lb 7.9 oz)  BSA (Calculated - sq m): 1.9 sq meters  BMI (Calculated): 36.8  Weight in (lb) to have BMI = 25: 127.7]  Wt Readings from Last 2 Encounters:   11/22/24 85.5 kg (188 lb 7.9 oz)   10/25/24 85.8 kg (189 lb 3.2 oz)       Physical Exam  Vitals and nursing note reviewed.   Constitutional:       Appearance: She is normal weight.   HENT:      Head: Normocephalic and atraumatic.      Nose: Nose normal.   Eyes:      Pupils: Pupils are equal, round, and reactive to light.   Cardiovascular:      Rate and Rhythm: Normal rate and regular rhythm.      Pulses: Normal pulses.      Heart sounds: Normal heart sounds.   Pulmonary:      Effort: Pulmonary effort is  "normal.      Breath sounds: Normal breath sounds.   Abdominal:      General: Bowel sounds are normal.      Palpations: Abdomen is soft.   Musculoskeletal:      Cervical back: Normal range of motion.   Skin:     General: Skin is warm.   Neurological:      General: No focal deficit present.      Mental Status: She is alert and oriented to person, place, and time.   Psychiatric:         Mood and Affect: Mood normal.          Laboratory  Lab Results   Component Value Date    WBC 8.2 08/30/2024    RBC 3.43 (L) 08/30/2024    HGB 9.7 (L) 08/30/2024    HCT 30.7 (L) 08/30/2024    MCV 90 08/30/2024    MCH 28.3 08/30/2024    MCHC 31.6 08/30/2024    RDW 13.4 08/30/2024     08/30/2024    LYMPH 2.0 08/30/2024    MONO 9 08/30/2024    MONO 0.8 08/30/2024    EOS 0.1 08/30/2024    BASO 0.0 08/30/2024    EOSINOPHIL 1 08/30/2024    BASOPHIL 1 08/30/2024       BMP  Lab Results   Component Value Date     (H) 08/30/2024    K 4.2 08/30/2024     08/30/2024    CO2 25 08/30/2024    BUN 24 08/30/2024    CREATININE 2.04 (H) 08/30/2024    CALCIUM 9.2 08/30/2024    EGFRNONAA 37 (L) 01/06/2022    AST 16 08/30/2024    ALT 13 08/30/2024    PROT 7.2 02/19/2020          Lab Results   Component Value Date    IGE 1142 (H) 06/12/2023        No results found for: "ASPERGILLUS"  No results found for: "AFUMIGATUSCL"     No results found for: "ACE"     Diagnostic Results:  I have personally reviewed today the following studies:    X-Ray Chest PA And Lateral  Narrative: EXAMINATION:  XR CHEST PA AND LATERAL    CLINICAL HISTORY:  Severe persistent asthma, uncomplicated    TECHNIQUE:  PA and lateral views of the chest were performed.    COMPARISON:  05/29/2024    FINDINGS:  The lungs are clear and free of infiltrate.  No pleural effusion or pneumothorax. The heart is enlarged.  There is tortuosity of the descending thoracic aorta.  Impression: 1.  No acute cardiopulmonary process.    Electronically signed by: Jax Yan, " "DO  Date:    10/25/2024  Time:    13:58                PFT    Lab Results   Component Value Date    [facility]  11/21/2024     Spirometry shows mild restriction based on the reduction in FVC. In addition the JLS94-40 is reduced suggesting obstruction may also be present. Overall spirometry shows moderate ventilatory impairment.   Â   Notes:  Â   Consider lung volume determination to help explain the etiology for the reduced FVC (restriction and/or air trapping).       [facility]  10/25/2024     Spirometry shows obstruction with severe ventilatory impairment. This impairment may be due to the obstruction alone but restrictive disease is not ruled out. Spirometry shows borderline improvement following bronchodilator.   Â   Notes:  Â   The failure to demonstrate improvement in spirometry does not preclude a clinical response to a trial of bronchodilators. Consider lung volume determination to help explain the etiology for the reduced FVC (restriction and/or air trapping). Also consider   DLCO determination if clinically indicated.       [facility]  07/07/2023     Spirometry shows mild restriction based on the reduction in the post bronchodilator FVC. In addition the QCA84-01 is reduced suggesting obstruction may also be present. Spirometry remains unimproved following bronchodilator.   Â   Notes:  Â   The failure to demonstrate improvement in spirometry does not preclude a clinical response to a trial of bronchodilators. Consider lung volume determination to help explain the etiology for the reduced FVC (restriction and/or air trapping). Also consider   DLCO determination if clinically indicated.          No results for input(s): "PH", "PCO2", "PO2", "HCO3", "POCSATURATED", "BE" in the last 72 hours.        Office Spirometry Results:     FEV1: 1.09L( 64.2%), FEV1/FVC 72, FVC 1.51L( 70.4%)    Assessment/Plan:     Problem List Items Addressed This Visit       History of pneumonia    Essential hypertension, benign    Other " hyperlipidemia    Severe persistent asthma without complication - Primary     Asthma Control Test  In the past 4  weeks, how much of the time did your asthma keep you from getting as much done at work, school or at home?: None of the time  During the past 4 weeks, how often have you had shortness of breath?: Once or twice a week  During the past 4 weeks, how often did your asthma symptoms (wheezing, couging, shortness of breath, chest tightness or pain) wake you up at night or earlier than usual in the morning?: Not at all  During the past 4 weeks, how often have you used your rescue inhaler or nebulizer medication (such as albuterol)?: Once a week or less  How would you rate your asthma control during the past 4 weeks?: Somewhat controlled  If your score is 19 or less, your asthma may not be under control: 21      Continue KJ + TRELEGY         Relevant Orders    Spirometry with/without bronchodilator        Doing well       Follow up in about 3 months (around 2/22/2025), or Jono Serra.    This note was prepared using voice recognition system and is likely to have sound alike errors that may have been overlooked even after proof reading.  Please call me with any questions    Discussed diagnosis, its evaluation, treatment and usual course. All questions answered.    Thank you for the courtesy of participating in the care of this patient    Edwar Mathews MD      Personal Diagnostic Review  [x]  CXR    []  ECHO    []  ONSAT    []  6MWD    []  LABS    []  CHEST CT    []  PET CT    []  Biopsy results

## 2024-11-23 NOTE — ASSESSMENT & PLAN NOTE
Asthma Control Test  In the past 4  weeks, how much of the time did your asthma keep you from getting as much done at work, school or at home?: None of the time  During the past 4 weeks, how often have you had shortness of breath?: Once or twice a week  During the past 4 weeks, how often did your asthma symptoms (wheezing, couging, shortness of breath, chest tightness or pain) wake you up at night or earlier than usual in the morning?: Not at all  During the past 4 weeks, how often have you used your rescue inhaler or nebulizer medication (such as albuterol)?: Once a week or less  How would you rate your asthma control during the past 4 weeks?: Somewhat controlled  If your score is 19 or less, your asthma may not be under control: 21      Continue KJ + TRELEGY

## 2024-12-30 PROBLEM — E88.810 DYSMETABOLIC SYNDROME X: Status: ACTIVE | Noted: 2024-12-30

## 2024-12-30 PROBLEM — N18.4 CKD (CHRONIC KIDNEY DISEASE) STAGE 4, GFR 15-29 ML/MIN: Status: ACTIVE | Noted: 2024-12-30

## 2025-01-14 ENCOUNTER — TELEPHONE (OUTPATIENT)
Dept: TRANSPLANT | Facility: CLINIC | Age: 70
End: 2025-01-14
Payer: COMMERCIAL

## 2025-01-15 ENCOUNTER — EPISODE CHANGES (OUTPATIENT)
Dept: TRANSPLANT | Facility: CLINIC | Age: 70
End: 2025-01-15

## 2025-01-28 ENCOUNTER — TELEPHONE (OUTPATIENT)
Dept: TRANSPLANT | Facility: CLINIC | Age: 70
End: 2025-01-28
Payer: COMMERCIAL

## 2025-02-11 ENCOUNTER — TELEPHONE (OUTPATIENT)
Dept: TRANSPLANT | Facility: CLINIC | Age: 70
End: 2025-02-11
Payer: COMMERCIAL

## 2025-02-12 ENCOUNTER — PATIENT MESSAGE (OUTPATIENT)
Dept: PULMONOLOGY | Facility: CLINIC | Age: 70
End: 2025-02-12
Payer: COMMERCIAL

## 2025-02-14 DIAGNOSIS — N18.6 END STAGE RENAL DISEASE: Primary | ICD-10-CM

## 2025-02-14 DIAGNOSIS — Z76.82 ORGAN TRANSPLANT CANDIDATE: ICD-10-CM

## 2025-03-05 ENCOUNTER — OFFICE VISIT (OUTPATIENT)
Dept: PULMONOLOGY | Facility: CLINIC | Age: 70
End: 2025-03-05
Payer: COMMERCIAL

## 2025-03-05 ENCOUNTER — CLINICAL SUPPORT (OUTPATIENT)
Dept: PULMONOLOGY | Facility: CLINIC | Age: 70
End: 2025-03-05
Payer: COMMERCIAL

## 2025-03-05 VITALS
WEIGHT: 180 LBS | HEART RATE: 60 BPM | SYSTOLIC BLOOD PRESSURE: 100 MMHG | RESPIRATION RATE: 14 BRPM | BODY MASS INDEX: 35.34 KG/M2 | DIASTOLIC BLOOD PRESSURE: 66 MMHG | OXYGEN SATURATION: 98 % | HEIGHT: 60 IN

## 2025-03-05 DIAGNOSIS — N18.4 CKD (CHRONIC KIDNEY DISEASE) STAGE 4, GFR 15-29 ML/MIN: ICD-10-CM

## 2025-03-05 DIAGNOSIS — J45.50 SEVERE PERSISTENT ASTHMA WITHOUT COMPLICATION: Primary | ICD-10-CM

## 2025-03-05 DIAGNOSIS — J45.50 SEVERE PERSISTENT ASTHMA WITHOUT COMPLICATION: ICD-10-CM

## 2025-03-05 LAB
BRPFT: ABNORMAL
FEF 25 75 LLN: 1.11
FEF 25 75 PRE REF: 28 %
FEF 25 75 REF: 2.51
FEV1 FVC LLN: 66
FEV1 FVC PRE REF: 94.1 %
FEV1 FVC REF: 79
FEV1 LLN: 1.18
FEV1 PRE REF: 67 %
FEV1 REF: 1.69
FVC LLN: 1.52
FVC PRE REF: 70.9 %
FVC REF: 2.13
PEF LLN: 2.62
PEF PRE REF: 94.8 %
PEF REF: 4.38
PRE FEF 25 75: 0.7 L/S (ref 1.11–3.91)
PRE FET 100: 7.79 SEC
PRE FEV1 FVC: 74.56 % (ref 66.41–90.34)
PRE FEV1: 1.13 L (ref 1.18–2.16)
PRE FVC: 1.51 L (ref 1.52–2.78)
PRE PEF: 4.15 L/S (ref 2.62–6.14)

## 2025-03-05 PROCEDURE — 99999 PR PBB SHADOW E&M-EST. PATIENT-LVL V: CPT | Mod: PBBFAC,TXP,, | Performed by: HOSPITALIST

## 2025-03-05 NOTE — ASSESSMENT & PLAN NOTE
- doing well, no respiratory complaints at this time, no oral steroids since last seen, no ED/UC visits since November  - continue Trelegy daily with albuterol as needed  - reviewed josh- stable from November  - PFT and walk on follow up

## 2025-03-05 NOTE — PROGRESS NOTES
"Subjective:      Patient ID: Aminah Dockery is a 69 y.o. female.    Chief Complaint: Asthma    Interval Hx 3/5/35:    Mrs. Dockery presents for follow up asthma. She was last seen 11/2024 by Dr. Mathews- at that visit she reported improved asthma symptoms on Trelegy, josh ordered.     No ER or UC visits since last seen. No oral steroids in a while. Is at her baseline. No recent wheezing or cough.   Up to date with Arexvy and flu.   Is scheduled to have AVF placed on Monday    Interim Testing:  - Josh 3/5/25-Possible mild restriction with preFVC 70.9% predicted, possible obstruction with decreased FEF 25-75, flow loop reflects this as well, values stable compared to 11/2024    Pulmonary Interventions:  - Trelegy 200mcg- every morning  - Albuterol hfa- last used this morning when walking from car to building      Review of Systems   Respiratory:  Negative for cough, sputum production, wheezing and dyspnea on extertion.      Objective:     Physical Exam   Constitutional: She is oriented to person, place, and time. She appears well-developed and well-nourished. She is obese.   Cardiovascular: Normal rate and regular rhythm.   Pulmonary/Chest: Normal expansion, effort normal and breath sounds normal.   Neurological: She is alert and oriented to person, place, and time.   Skin: Skin is warm and dry.     Personal Diagnostic Review  As Above      12/30/2024     1:48 PM 11/22/2024     1:11 PM 10/25/2024     2:23 PM 9/25/2024    10:23 AM 9/9/2024    11:10 AM 8/1/2024    10:00 AM 5/16/2024    11:47 AM   Pulmonary Function Tests   SpO2 96 % 98 % 97 % 95 % 98 % 97 % 98 %   Height 5' (1.524 m) 5' (1.524 m) 5' (1.524 m) 5' 1" (1.549 m) 5' 1" (1.549 m) 5' 1" (1.549 m) 5' 1" (1.549 m)   Weight 82.1 kg (181 lb) 85.5 kg (188 lb 7.9 oz) 85.8 kg (189 lb 3.2 oz) 83 kg (183 lb) 85.3 kg (188 lb) 83.8 kg (184 lb 11.9 oz) 84.1 kg (185 lb 6.4 oz)   BMI (Calculated) 35.3 36.8 37 34.6 35.5 34.9 35        Assessment:     No diagnosis " found.     Encounter Medications[1]  No orders of the defined types were placed in this encounter.        Plan:     Problem List Items Addressed This Visit       Severe persistent asthma without complication - Primary    - doing well, no respiratory complaints at this time, no oral steroids since last seen, no ED/UC visits since November  - continue Trelegy daily with albuterol as needed  - reviewed josh- stable from November  - PFT and walk on follow up         Relevant Orders    Complete PFT with bronchodilator    Stress test, pulmonary    CKD (chronic kidney disease) stage 4, GFR 15-29 ml/min    - pt with AVF planned Monday          Follow up in 6 months, testing in one year.         [1]   Outpatient Encounter Medications as of 3/5/2025   Medication Sig Dispense Refill    albuterol (PROVENTIL/VENTOLIN HFA) 90 mcg/actuation inhaler INHALE 2 PUFFS INTO THE LUNGS EVERY 4 HOURS AS NEEDED FOR WHEEZING OR RESCUE 6.7 g 5    amLODIPine (NORVASC) 10 MG tablet Take 1 tablet (10 mg total) by mouth once daily. 90 tablet 1    benzonatate (TESSALON) 200 MG capsule Take 200 capsules by mouth every 8 (eight) hours as needed.      calcitRIOL (ROCALTROL) 0.25 MCG Cap Take 0.25 mcg by mouth.      cholecalciferol, vitamin D3, 1,250 mcg (50,000 unit) capsule TAKE 1 CAPSULE BY MOUTH ONE TIME PER WEEK 15 capsule 1    doxazosin (CARDURA) 4 MG tablet Take 1 tablet (4 mg total) by mouth every evening. 90 tablet 1    fluticasone-umeclidin-vilanter (TRELEGY ELLIPTA) 200-62.5-25 mcg inhaler Inhale 1 puff into the lungs once daily. 60 each 11    isosorbide mononitrate (IMDUR) 30 MG 24 hr tablet Take 30 mg by mouth.      JARDIANCE 10 mg tablet Take 10 mg by mouth once daily.      metoprolol succinate (TOPROL-XL) 100 MG 24 hr tablet TAKE 1 AND 1/2 TABLETS BY MOUTH ONCE DAILY 135 tablet 1    rosuvastatin (CRESTOR) 40 MG Tab Take 1 tablet (40 mg total) by mouth every evening. 90 tablet 1    RSVPreF3 antigen-AS01E, PF, (AREXVY, PF,) 120 mcg/0.5  mL SusR vaccine Inject into the muscle. 0.5 mL 0    spironolactone (ALDACTONE) 25 MG tablet Take 25 mg by mouth once daily.       No facility-administered encounter medications on file as of 3/5/2025.

## 2025-03-07 ENCOUNTER — TELEPHONE (OUTPATIENT)
Dept: TRANSPLANT | Facility: CLINIC | Age: 70
End: 2025-03-07
Payer: COMMERCIAL

## 2025-03-10 ENCOUNTER — TELEPHONE (OUTPATIENT)
Dept: TRANSPLANT | Facility: CLINIC | Age: 70
End: 2025-03-10
Payer: COMMERCIAL

## 2025-03-10 NOTE — TELEPHONE ENCOUNTER
MA notes per Pre Dialysis adherence form     FOR THE PAST THREE MONTHS:    0-Appt Adhrence  0-No show    No concerns with labs, care giving, transportation, or mental health    Per adherence form pt follows up as scheduled. Pts caregiver is daughter.     Scanned in pt's media     Francia Wetzel  Abdominal Transplant MA

## 2025-03-19 ENCOUNTER — HOSPITAL ENCOUNTER (OUTPATIENT)
Dept: RADIOLOGY | Facility: HOSPITAL | Age: 70
Discharge: HOME | End: 2025-03-19
Payer: COMMERCIAL

## 2025-03-19 ENCOUNTER — HOSPITAL ENCOUNTER (OUTPATIENT)
Dept: RADIOLOGY | Facility: HOSPITAL | Age: 70
Discharge: HOME | End: 2025-03-19
Attending: INTERNAL MEDICINE
Payer: COMMERCIAL

## 2025-03-19 ENCOUNTER — OFFICE VISIT (OUTPATIENT)
Dept: TRANSPLANT | Facility: CLINIC | Age: 70
End: 2025-03-19
Payer: COMMERCIAL

## 2025-03-19 VITALS
SYSTOLIC BLOOD PRESSURE: 130 MMHG | OXYGEN SATURATION: 98 % | DIASTOLIC BLOOD PRESSURE: 56 MMHG | HEIGHT: 60 IN | TEMPERATURE: 97 F | HEART RATE: 57 BPM | RESPIRATION RATE: 16 BRPM | BODY MASS INDEX: 34.85 KG/M2 | WEIGHT: 177.5 LBS

## 2025-03-19 DIAGNOSIS — N25.81 SECONDARY HYPERPARATHYROIDISM: ICD-10-CM

## 2025-03-19 DIAGNOSIS — N18.6 END STAGE RENAL DISEASE: ICD-10-CM

## 2025-03-19 DIAGNOSIS — E66.09 CLASS 1 OBESITY DUE TO EXCESS CALORIES WITH SERIOUS COMORBIDITY AND BODY MASS INDEX (BMI) OF 34.0 TO 34.9 IN ADULT: ICD-10-CM

## 2025-03-19 DIAGNOSIS — D63.1 ANEMIA IN STAGE 5 CHRONIC KIDNEY DISEASE, NOT ON CHRONIC DIALYSIS: ICD-10-CM

## 2025-03-19 DIAGNOSIS — N18.5 TYPE 2 DM WITH CKD STAGE 5 AND HYPERTENSION: ICD-10-CM

## 2025-03-19 DIAGNOSIS — E88.810 DYSMETABOLIC SYNDROME X: ICD-10-CM

## 2025-03-19 DIAGNOSIS — I25.10 CORONARY ARTERY DISEASE INVOLVING NATIVE CORONARY ARTERY OF NATIVE HEART WITHOUT ANGINA PECTORIS: ICD-10-CM

## 2025-03-19 DIAGNOSIS — R80.8 OTHER PROTEINURIA: ICD-10-CM

## 2025-03-19 DIAGNOSIS — Z01.818 PRE-TRANSPLANT EVALUATION FOR CHRONIC KIDNEY DISEASE: Primary | ICD-10-CM

## 2025-03-19 DIAGNOSIS — Z76.82 ORGAN TRANSPLANT CANDIDATE: ICD-10-CM

## 2025-03-19 DIAGNOSIS — E11.22 TYPE 2 DM WITH CKD STAGE 5 AND HYPERTENSION: ICD-10-CM

## 2025-03-19 DIAGNOSIS — E66.811 CLASS 1 OBESITY DUE TO EXCESS CALORIES WITH SERIOUS COMORBIDITY AND BODY MASS INDEX (BMI) OF 34.0 TO 34.9 IN ADULT: ICD-10-CM

## 2025-03-19 DIAGNOSIS — I12.0 TYPE 2 DM WITH CKD STAGE 5 AND HYPERTENSION: ICD-10-CM

## 2025-03-19 DIAGNOSIS — N18.5 ANEMIA IN STAGE 5 CHRONIC KIDNEY DISEASE, NOT ON CHRONIC DIALYSIS: ICD-10-CM

## 2025-03-19 PROBLEM — M85.80 OSTEOPENIA: Status: RESOLVED | Noted: 2019-07-28 | Resolved: 2025-03-19

## 2025-03-19 PROBLEM — N95.9 MENOPAUSAL PROBLEM: Status: RESOLVED | Noted: 2019-07-22 | Resolved: 2025-03-19

## 2025-03-19 PROBLEM — E87.6 HYPOKALEMIA: Status: RESOLVED | Noted: 2018-08-05 | Resolved: 2025-03-19

## 2025-03-19 PROBLEM — N18.32 STAGE 3B CHRONIC KIDNEY DISEASE: Status: RESOLVED | Noted: 2018-03-25 | Resolved: 2025-03-19

## 2025-03-19 PROBLEM — Z87.01 HISTORY OF PNEUMONIA: Status: RESOLVED | Noted: 2020-08-03 | Resolved: 2025-03-19

## 2025-03-19 PROBLEM — N18.4 CKD (CHRONIC KIDNEY DISEASE) STAGE 4, GFR 15-29 ML/MIN: Status: RESOLVED | Noted: 2024-12-30 | Resolved: 2025-03-19

## 2025-03-19 PROCEDURE — 93978 VASCULAR STUDY: CPT | Mod: TC,TXP

## 2025-03-19 PROCEDURE — 99999 PR PBB SHADOW E&M-EST. PATIENT-LVL V: CPT | Mod: PBBFAC,TXP,, | Performed by: NURSE PRACTITIONER

## 2025-03-19 PROCEDURE — 71046 X-RAY EXAM CHEST 2 VIEWS: CPT | Mod: TC,TXP

## 2025-03-19 PROCEDURE — 76770 US EXAM ABDO BACK WALL COMP: CPT | Mod: 26,59,TXP, | Performed by: RADIOLOGY

## 2025-03-19 PROCEDURE — 76770 US EXAM ABDO BACK WALL COMP: CPT | Mod: TC,TXP

## 2025-03-19 PROCEDURE — 72192 CT PELVIS W/O DYE: CPT | Mod: TC,TXP

## 2025-03-19 PROCEDURE — 72192 CT PELVIS W/O DYE: CPT | Mod: 26,TXP,, | Performed by: RADIOLOGY

## 2025-03-19 NOTE — PROGRESS NOTES
INITIAL PATIENT EDUCATION NOTE AA    Ms. Aminah Dockery was seen in pre-kidney transplant clinic for evaluation for kidney, kidney/pancreas or pancreas only transplant.  The patient attended an individual video education session that discussed/reviewed the following aspects of transplantation: evaluation including diagnostic and laboratory testing,(Chemistries, Hematology, Serologies including HIV and Hepatitis and HLA) required for transplantation and selection committee process, UNOS waitlist management/multiple listings, types of organs offered (KDPI < 85%, KDPI > 85%, PHS risk, DCD, HCV+, HIV+ for HIV+ recipients and enbloc/dual), financial aspects, surgical procedures, dietary instruction pre- and post-transplant, health maintenance pre- and post-transplant, post-transplant hospitalization and outpatient follow-up, potential to participate in a research protocol, and medication management and side effects.  A question and answer session was provided after the presentation.    The patient was seen by all members of the multi-disciplinary team to include: Nephrologist/MARYAM, Surgeon, , Transplant Coordinator, , Pharmacist and Dietician (if applicable).    The patient reviewed and signed all consents for evaluation which were witnessed and sent to scanning into the [facility] chart.    The patient was given an education book and plan for further evaluation based on her individual assessment.      The Patient was educated on OPTN policy change regarding race based eGFR. For Black or  Americans, this eGFR could have shown that their kidneys were working better than they were.    Because of this change, we are looking at everyones record and assessing waiting time for people who are eligible. We will be reviewing your medical records and will notify you if you are eligible. We also encouraged patient to provide span 20 labs that are not in our electronic medical  records    Reviewed program requirement for complete COVID vaccination with documentation prior to listing.  COVID education information reviewed with patient. Patient encouraged to be up to date on all vaccinations.     The patient was informed that the transplant team would manage immediate post op pain. If the patient requires long term pain management, they will need to have that pain management addressed by their PCP or previous provider who wrote for long term pain medicines.    The patient was encouraged to call with any questions or concerns.

## 2025-03-19 NOTE — PROGRESS NOTES
Transplant Nephrology  Kidney Transplant Recipient Evaluation    Referring Physician: Jose R Guevara  Current Nephrologist: Jose R Guevara    Subjective:   CC:  Initial evaluation of kidney transplant candidacy.    HPI:  Ms. Dockery is a 69 y.o. year old Black or  female who has presented to be evaluated as a potential kidney transplant recipient.  She has advanced kidney disease secondary to diabetic nephropathy and HTN.  Patient is currently pre-dialysis. She has a RUE AV fistula for dialysis access.     Previous Transplant: no  Donor: no   Discussed and strongly encouraged living donation   Fx assessment: works as an  coordinator . Does not formally exercise, cont to run errands and take care of her household . Looks good, not frail       Cardiac HX:   HTN, CAD, HLD (dysmetabolic syndrome)   ABN stress test 6/2024 and underwent a LHC  -Pt denies intervention, report not available for review in clinic  Not on AC    HX diabetes 2  Dx-pt is unaware of dx   Meds: has been on jardiance for 2 months   Complications : denies   Lab Results   Component Value Date    HGBA1C 5.6 03/19/2025       Past Medical History:   Diagnosis Date    Anemia     Asthma     Coronary artery disease     Diabetes mellitus, type 2     Disorder of kidney and ureter     Hyperlipidemia     Hyperparathyroidism, secondary     Hypertension     Obesity     Proteinuria     Routine general medical examination at a health care facility 07/14/2017    Vitamin D deficiency        Past Medical and Surgical History: Ms. Dockery  has a past medical history of Anemia, Asthma, Coronary artery disease, Diabetes mellitus, type 2, Disorder of kidney and ureter, Hyperlipidemia, Hyperparathyroidism, secondary, Hypertension, Obesity, Proteinuria, Routine general medical examination at a health care facility, and Vitamin D deficiency.  She has a past surgical history that includes Hysterectomy and Left heart catheterization  "(06/2024).    Past Social and Family History: Ms. Dockery reports that she has never smoked. She has never used smokeless tobacco. She reports current alcohol use. She reports that she does not use drugs. Her family history includes Cancer in her mother; Heart disease in her brother; Heart failure in her brother; Hypertension in her father and sister; Ovarian cancer in her mother; Stroke in her father.    Review of Systems   Constitutional:  Positive for fatigue. Negative for activity change, appetite change, chills, fever and unexpected weight change.   HENT:  Negative for congestion, facial swelling, postnasal drip, rhinorrhea, sinus pressure, sore throat and trouble swallowing.    Eyes:  Negative for pain, redness and visual disturbance.   Respiratory:  Negative for cough, chest tightness, shortness of breath and wheezing.    Cardiovascular:  Positive for leg swelling. Negative for chest pain and palpitations.   Gastrointestinal:  Negative for abdominal pain, diarrhea, nausea and vomiting.        Indigestion    Genitourinary:  Negative for dysuria, flank pain and urgency.   Musculoskeletal:  Negative for gait problem, neck pain and neck stiffness.   Skin:  Negative for rash.   Allergic/Immunologic: Negative for environmental allergies, food allergies and immunocompromised state.   Neurological:  Negative for dizziness, weakness, light-headedness and headaches.   Psychiatric/Behavioral:  Negative for agitation and confusion. The patient is not nervous/anxious.        Objective:   Blood pressure (!) 130/56, pulse (!) 57, temperature 97.2 °F (36.2 °C), temperature source Temporal, resp. rate 16, height 5' 0.32" (1.532 m), weight 80.5 kg (177 lb 7.5 oz), SpO2 98%.body mass index is 34.3 kg/m².    Physical Exam  Constitutional:       Appearance: Normal appearance. She is well-developed.   HENT:      Head: Normocephalic.      Nose: Nose normal.   Eyes:      Conjunctiva/sclera: Conjunctivae normal.      Pupils: Pupils " "are equal, round, and reactive to light.   Cardiovascular:      Rate and Rhythm: Normal rate and regular rhythm.      Heart sounds: Normal heart sounds.   Pulmonary:      Effort: Pulmonary effort is normal.      Breath sounds: Normal breath sounds.   Abdominal:      General: Bowel sounds are normal.      Palpations: Abdomen is soft.   Musculoskeletal:        Arms:       Cervical back: Normal range of motion and neck supple.      Right lower leg: Edema present.      Left lower leg: Edema present.   Skin:     General: Skin is warm and dry.   Neurological:      Mental Status: She is alert and oriented to person, place, and time.   Psychiatric:         Behavior: Behavior normal.         Labs:  Lab Results   Component Value Date    WBC 6.02 03/19/2025    HGB 9.6 (L) 03/19/2025    HCT 30.7 (L) 03/19/2025     03/19/2025    K 4.9 03/19/2025     03/19/2025    CO2 18 (L) 03/19/2025    BUN 47 (H) 03/19/2025    CREATININE 3.4 (H) 03/19/2025    EGFRNORACEVR 14.1 (A) 03/19/2025    CALCIUM 8.6 (L) 03/19/2025    PHOS 4.2 03/19/2025    ALBUMIN 3.7 03/19/2025    AST 14 03/19/2025    ALT 9 (L) 03/19/2025    .9 (H) 03/19/2025       Lab Results   Component Value Date    BILIRUBINUA Negative 11/02/2007    PROTEINUA 2+ (A) 03/04/2024    NITRITE Negative 03/04/2024    RBCUA None seen 03/04/2024    WBCUA 0-5 03/04/2024       No results found for: "HLAABCTYPE"    Labs were reviewed with the patient.    Assessment:     1. Pre-transplant evaluation for chronic kidney disease    2. Type 2 DM with CKD stage 5 and hypertension    3. Coronary artery disease involving native coronary artery of native heart without angina pectoris    4. Dysmetabolic syndrome X    5. Other proteinuria    6. Anemia in stage 5 chronic kidney disease, not on chronic dialysis    7. Secondary hyperparathyroidism    8. Class 1 obesity due to excess calories with serious comorbidity and body mass index (BMI) of 34.0 to 34.9 in adult        Plan:   heart " cath 6/10/2024 with Dr. Real after abnormal stress test.   Get report and cardiology clearance     Transplant Candidacy:   Based on available information, Ms. Dockery is a suitable kidney transplant candidate.   Meets center eligibility for accepting HCV+ donor offer - Yes.  Patient educated on HCV+ donors. Aminah is willing to accept HCV+ donor offer - Yes   Patient is a candidate for KDPI > 85 kidney donor offer - Yes   Final determination of transplant candidacy will be made once workup is complete and reviewed by the selection committee.    Patient advised that it is recommended that all transplant candidates, and their close contacts and household members receive Covid vaccination.    UNOS Patient Status  Functional Status: 80% - Normal activity with effort: some symptoms of disease     Candice Felipe, NP

## 2025-03-19 NOTE — PROGRESS NOTES
PHARM.D. PRE-TRANSPLANT NOTE:    This patient's medication therapy was evaluated as part of her pre-transplant evaluation.      The following general pharmacologic concerns were noted: none    The following concerns for post-operative pain management were noted: none    The following pharmacologic concerns related to HCV therapy were noted: The max dose of rosuvastatin while on treatment would be 10 mg      This patient's medication profile was reviewed for considerations for DAA Hepatitis C therapy:    [x]  No current inducers of CYP 3A4 or PGP  [x]  No amiodarone on this patient's EMR profile in the last 24 months  [x]  No past or current atrial fibrillation on this patient's EMR profile       Current Medications[1]        I am available for consultation and can be contacted, as needed by the other members of the Transplant team.            [1]   Current Outpatient Medications   Medication Sig Dispense Refill    albuterol (PROVENTIL/VENTOLIN HFA) 90 mcg/actuation inhaler INHALE 2 PUFFS INTO THE LUNGS EVERY 4 HOURS AS NEEDED FOR WHEEZING OR RESCUE 6.7 g 5    amLODIPine (NORVASC) 10 MG tablet Take 1 tablet (10 mg total) by mouth once daily. 90 tablet 1    benzonatate (TESSALON) 200 MG capsule Take 200 capsules by mouth every 8 (eight) hours as needed.      calcitRIOL (ROCALTROL) 0.25 MCG Cap Take 0.25 mcg by mouth.      cholecalciferol, vitamin D3, 1,250 mcg (50,000 unit) capsule TAKE 1 CAPSULE BY MOUTH ONE TIME PER WEEK 15 capsule 1    doxazosin (CARDURA) 4 MG tablet Take 1 tablet (4 mg total) by mouth every evening. 90 tablet 1    fluticasone-umeclidin-vilanter (TRELEGY ELLIPTA) 200-62.5-25 mcg inhaler Inhale 1 puff into the lungs once daily. 60 each 11    isosorbide mononitrate (IMDUR) 30 MG 24 hr tablet Take 30 mg by mouth.      JARDIANCE 10 mg tablet Take 10 mg by mouth once daily.      metoprolol succinate (TOPROL-XL) 100 MG 24 hr tablet TAKE 1 AND 1/2 TABLETS BY MOUTH ONCE DAILY 135 tablet 1    rosuvastatin  (CRESTOR) 40 MG Tab Take 1 tablet (40 mg total) by mouth every evening. 90 tablet 1    spironolactone (ALDACTONE) 25 MG tablet Take 25 mg by mouth once daily.       No current facility-administered medications for this visit.

## 2025-03-19 NOTE — PROGRESS NOTES
Transplant Recipient Adult Psychosocial Assessment    Aminah Dockery  [address] [place] [place] [address]  Telephone Information:   Mobile [phone]   Home  [phone] (home)  Work  There is no work phone number on file.  E-mail  [email or web address]    Sex: female  YOB: 1955  Age: 69 y.o.    Encounter Date: 3/19/2025  [place] Citizen: yes  Primary Language: English   Needed: no    Emergency Contact:  Name: Jessika Dockery   Relationship: daughter  Address: [address] [place] [facility] [address]  Phone Numbers:  [phone] (work), [phone] (mobile)    Family/Social Support:   Number of dependents/: The patient reported that she does not have any dependents at this time.   Marital history: The patient reported that she is single.   Other family dynamics: The patient's parents are . The patient reported that she had two brothers and both are  at this time. The patient reported that she has one sister Lili Avendaño that will be providing assistance throughout her transplant process.     Household Composition:  Name: Jessika Avendaño   Age: 44  Relationship: daughter  Does person drive? yes      Do you and your caregivers have access to reliable transportation? yes  PRIMARY CAREGIVER: Jessika Avendaño (daughter) will be primary caregiver, phone number [phone].      provided in-depth information to patient and caregiver regarding pre- and post-transplant caregiver role.   strongly encourages patient and caregiver to have concrete plan regarding post-transplant care giving, including back-up caregiver(s) to ensure care giving needs are met as needed.    Patient and Caregiver states understanding all aspects of caregiver role/commitment and is able/willing/committed to being caregiver to the fullest extent necessary.    Patient and Caregiver verbalizes understanding of the education provided today and caregiver  responsibilities.         remains available. Patient and Caregiver agree to contact  in a timely manner if concerns arise.      Able to take time off work without financial concerns: yes. The patient's caregiver reported that she will be able to take time off work without financial concerns.     Additional Significant Others who will Assist with Transplant:  Name: Lili Avendaño   Age: 77  City: [place] State: [place]  Relationship: sister  Does person drive? yes      Living Will: no  Healthcare Power of : no  Advance Directives on file: <<no information> per medical record.  Verbally reviewed LW/HCPA information.   provided patient with copy of LW/HCPA documents and provided education on completion of forms.    Living Donors: No.    Highest Education Level: Associate/Bachelor Degree  Reading Ability: college  Reports difficulty with: N/A  Learns Best By:  Hands- on      Status: no  VA Benefits: no     Working for Income: yes  If yes, working activity level: Working Full Time  Patient is  at [employer].      Spouse/Significant Other Employment: none    Disabled: no    Monthly Income:  Salary/Wages: $4,000  Able to afford all costs now and if transplanted, including medications: yes  Patient and Caregiver verbalizes understanding of personal responsibilities related to transplant costs and the importance of having a financial plan to ensure that patients transplant costs are fully covered.      provided fundraising information/education.  Patient and Caregiver verbalizes understanding.   remains available.    Insurance:   Payer/Plan Subscr  Sex Relation Sub. Ins. ID Effective Group Num   1. AETNA - AETNA* RAYMOND SARAH 1955 Female Self [id number]* 2/3/25                                    [address]   2. [employer] -* RAYMOND SARAH 1955 Female Self  [id number]* 1/1/20 [address] [address]     Primary Insurance (for UNOS reporting): Private Insurance  Secondary Insurance (for UNOS reporting): Private Insurance  Patient and Caregiver verbalizes clear understanding that patient may experience difficulty obtaining and/or be denied insurance coverage post-surgery. This includes and is not limited to disability insurance, life insurance, health insurance, burial insurance, long term care insurance, and other insurances.    Patient and Caregiver also reports understanding that future health concerns related to or unrelated to transplantation may not be covered by patient's insurance.  Resources and information provided and reviewed.      Patient and Caregiver provides verbal permission to release any necessary information to outside resources for patient care and discharge planning.  Resources and information provided are reviewed.      Dialysis Adherence:  The patient is Pre-Dialysis at this time.     MA notes per Pre Dialysis adherence form      FOR THE PAST THREE MONTHS:     0-Appt Adhrence  0-No show     No concerns with labs, care giving, transportation, or mental health     Per adherence form pt follows up as scheduled. Pts caregiver is daughter.       Infusion Service: patient utilizing? no  Home Health: patient utilizing? no  DME: yes [facility]PC and thermometer   Pulmonary/Cardiac Rehab: no   ADLS:  The patient completes her activities of daily living independently.     Adherence:   The patient reported that she attends all of her medical appointments as scheduled. The patient reported that she adheres to her medication regiment. The patient reported that she adheres to her medical instructions.     Adherence education and counseling provided.     Per History Section:  Past Medical History:   Diagnosis Date    Disorder of kidney and ureter     Hyperlipidemia     Hypertension     Routine general medical examination at [facility]  facility 07/14/2017    Vitamin D deficiency      Social History     Tobacco Use    Smoking status: Never    Smokeless tobacco: Never   Substance Use Topics    Alcohol use: Yes     Comment: social     Social History     Substance and Sexual Activity   Drug Use Never     Social History     Substance and Sexual Activity   Sexual Activity Yes    Partners: Male    Birth control/protection: See Surgical Hx       Per Today's Psychosocial:  Tobacco: none, patient denies any use.  Alcohol: Yes, the patient reported that she drinks but it is very rare.   Illicit Drugs/Non-prescribed Medications: none, patient denies any use.    Patient and Caregiver states clear understanding of the potential impact of substance use as it relates to transplant candidacy and is aware of possible random substance screening.  Substance abstinence/cessation counseling, education and resources provided and reviewed.     Arrests/DWI/Treatment/Rehab: patient denies    Psychiatric History:    Mental Health: The patient denies  current or past mental health diagnoses.   Psychiatrist/Counselor: The patient denies current or past treatment from a psychiatrist or counselor.   Medications:  The patient reported she does not take any medications related to mental health.   Suicide/Homicide Issues: The patient denies current or past suicidal or homicidal issues.    Safety at home: The patient reported that she feels safe at home.     Knowledge: Patient and Caregiver states having clear understanding and realistic expectations regarding the potential risks and potential benefits of organ transplantation and organ donation, agrees to discuss with health care team members and support system members, and to utilize available resources and express questions and/or concerns in order to further facilitate the pt informed decision-making.  Resources and information provided and reviewed.     Patient and Caregiver is aware of [facility]'s affiliation and/or partnership  with agencies in home health care, LTAC, SNF, [facility], and other hospitals and clinics.    Understanding: Patient and Caregiver reports having a clear understanding of the many lifetime commitments involved with being a transplant recipient, including costs, compliance, medications, lab work, procedures, appointments, concrete and financial planning, preparedness, timely and appropriate communication of concerns, abstinence (ETOH, tobacco, illicit non-prescribed drugs), adherence to all health care team recommendations, support system and caregiver involvement, appropriate and timely resource utilization and follow-through, mental health counseling as needed/recommended, and patient and caregiver responsibilities.  Social Service Handbook, resources and detailed educational information provided and reviewed.  Educational information provided.    Patient and Caregiver also reports current and expected compliance with health care regime and states having a clear understanding of the importance of compliance.      Patient and Caregiver reports a clear understanding that risks and benefits may be involved with organ transplantation and with organ donation.      Patient and Caregiver also reports clear understanding that psychosocial risk factors may affect patient, and include but are not limited to feelings of depression, generalized anxiety, anxiety regarding dependence on others, post traumatic stress disorder, feelings of guilt and other emotional and/or mental concerns, and/or exacerbation of existing mental health concerns.  Detailed resources provided and discussed.     Patient and Caregiver agrees to access appropriate resources in a timely manner as needed and/or as recommended, and to communicate concerns appropriately.  Patient and Caregiver also reports a clear understanding of treatment options available.      reviewed education, provided additional information, and answered questions.    Feelings  "or Concerns: The patient denies any concerns at this time.     Coping: Identify Patient & Caregiver Strategies to [place]:   1. In the past - supportive family, craft, puzzles, playing games on the computer, and tailgating outside on the patio    2. Currently & Pre-transplant -  supportive family, craft, puzzles, playing games on the computer, and tailgating outside on the patio   3. At the time of surgery -  supportive family, craft, puzzles, playing games on the computer, and tailgating outside on the patio   4. During post-Transplant & Recovery Period -  supportive family, craft, puzzles, playing games on the computer, and tailgating outside on the patio    Goals: My goal is to" keep striving and keep waking up" .  Patient referred to Vocational Rehabilitation.    Interview Behavior: Patient and Caregiver presents as alert and oriented x 4, pleasant, calm, communicative, cooperative, and asking and answering questions appropriately. During the assessment, the patient was present with her caregivers Jessika Avendaño and Lili Avendaño.          Transplant Social Work - Candidacy  Assessment/Plan:     Psychosocial Suitability: Patient presents as  low risk  candidate for kidney transplant at this time based on psychosocial risk factors. The patient has adequate insurance coverage at this time. The patient has a caregiver plan. The patient has a lodging plan in place. The patient does not have any acute mental health concerns at this time. The patient has a transportation plan in place as well.     Recommendations/Additional Comments: SW recommends that pt conduct fundraising to assist pt with pay for cost of medications, food, gas, and other transplant related needs. SW recommends that pt remain aware of potential mental health concerns and contact the team if any concerns arise. SW recommends that pt remain abstinent from tobacco, ETOH, and drug use. SW remains available to answer any questions or concerns that arise as " the pt moves through the transplant process.     Final determination of transplant candidacy will be reviewed and determined by the selection committee    Thee Acosta LCSW

## 2025-03-19 NOTE — PROGRESS NOTES
Transplant Surgery  Kidney Transplant Recipient Evaluation    Referring Physician: Jose R Guevara  Current Nephrologist: Jose R Guevara    Subjective:     Reason for Visit: evaluate transplant candidacy    History of Present Illness: Aminah Dockery is a 69 y.o. year old female undergoing transplant evaluation.    Dialysis History: Aminah is pre-dialysis.      Transplant History: N/A    Etiology of Renal Disease: Diabetes Mellitus - Type II (based on medical records from referral).    External provider notes reviewed: Yes    Review of Systems   Constitutional:  Negative for chills and fever.   HENT:  Negative for facial swelling and sore throat.    Eyes:  Negative for redness and itching.   Respiratory:  Negative for cough and shortness of breath.    Cardiovascular:  Negative for chest pain and leg swelling.   Gastrointestinal:  Negative for abdominal distention and abdominal pain.   Endocrine: Negative for polydipsia and polyphagia.   Genitourinary:  Negative for dysuria and hematuria.   Musculoskeletal:  Negative for back pain and myalgias.   Skin:  Negative for pallor and rash.   Allergic/Immunologic: Negative for environmental allergies and immunocompromised state.   Neurological:  Negative for light-headedness and headaches.   Hematological:  Negative for adenopathy. Does not bruise/bleed easily.   Psychiatric/Behavioral:  Negative for agitation and confusion.      Objective:     Physical Exam:  Constitutional:   Vitals reviewed: yes   Well-nourished and well-groomed: yes  Eyes:   Sclerae icteric: no   Extraocular movements intact: yes  GI:    Bowel sounds normal: yes   Tenderness: no    If yes, quadrant/location: not applicable   Palpable masses: no    If yes, quadrant/location: not applicable   Hepatosplenomegaly: no   Ascites: no   Hernia: no    If yes, type/location: not applicable   Surgical scars: no    If yes, type/location: not applicable  Resp:   Effort normal: yes   Breath sounds normal:  yes    CV:   Regular rate and rhythm: yes   Heart sounds normal: yes   Femoral pulses normal: yes   Extremities edematous: no  Skin:   Rashes or lesions present: no    If yes, describe:not applicable   Jaundice:: no    Musculoskeletal:   Gait normal: yes   Strength normal: yes  Psych:   Oriented to person, place, and time: yes   Affect and mood normal: yes    Additional comments: not applicable    Diagnostics:  The following labs have been reviewed: CBC  CMP  The following radiology images have been independently reviewed and interpreted:     Counseling: We provided Aminah Dockery with a group education session today.  We discussed kidney transplantation at length with her, including risks, potential complications, and alternatives in the management of her renal failure.  The discussion included complications related to anesthesia, bleeding, infection, primary nonfunction, and ATN.  I discussed the typical postoperative course, length of hospitalization, the need for long-term immunosuppression, and the need for long-term routine follow-up.  I discussed living-donor and -donor transplantation and the relative advantages and disadvantages of each.  I also discussed average waiting times for both living donation and  donation.  I discussed national and center-specific survival rates.  I also mentioned the potential benefit of multicenter listing to candidates listed with centers within more than one organ procurement organization.  All questions were answered.    Patient advised that it is recommended that all transplant candidates, and their close contacts and household members receive Covid vaccination.    Final determination of transplant candidacy will be made once evaluation is complete and reviewed by the Kidney & Kidney/Pancreas Selection Committee.    Coronavirus disease (COVID-19) caused by severe acute respiratory virus coronavirus 2 (SARS-C0V 2) is associated with increased mortality in  solid organ transplant recipients (SOT) compared to non-transplant patients. Vaccine responses to vaccination are depressed against SARS-CoV2 compared to normal individuals but improve with third vaccination doses. Vaccination prior to SOT provides both the best opportunity for transplant candidates to develop protective immunity and to reduce the risk of serious COVID19 infections post transplantation. Organ transplant candidates at [facility] will be required to receive SARS-CoV-2 vaccination prior to being listed with a an active status, whenever possible. Exceptions will be made for disability related reasons or for sincerely held [employer] beliefs.          Transplant Surgery - Candidacy   Assessment/Plan:   Aminah Dockery is pre-dialysis with CKD stage 4 (GFR 15-29 mL/min). I see no surgical contraindication to placing a kidney transplant. Based on available information, Aminah Dockery is a suitable kidney transplant candidate.     Additional testing to be completed according to the Written Order Guidelines for Adult Pre-kidney and Pancreas Transplant Evaluation (KI-02).  Interpretation of tests and discussion of patient management involves all members of the multidisciplinary transplant team.    Mateo Tejeda MD

## 2025-03-19 NOTE — LETTER
March 20, 2025        Jose R Guevara  [address] [address]  Phone: [phone]  Fax: [phone]             [facility] Transplant 1st Fl  [address]  [place] [address]  Phone: [phone]   Patient: Aminah Dockery   MR Number: [id number]   YOB: 1955   Date of Visit: 3/19/2025       Dear Dr. Jose R Guevara    Thank you for referring Aminah Dockery to me for evaluation. Attached you will find relevant portions of my assessment and plan of care.    If you have questions, please do not hesitate to call me. I look forward to following Aminah Dockery along with you.    Sincerely,    Candice Felipe, NP    Enclosure    If you would like to receive this communication electronically, please contact [email or web address] or [phone] to request [employer] access.    [employer] is a tool which provides read-only access to select patient information with whom you have a relationship. Its easy to use and provides real time access to review your patients record including encounter summaries, notes, results, and demographic information.    If you feel you have received this communication in error or would no longer like to receive these types of communications, please e-mail [email or web address]

## 2025-03-19 NOTE — PROGRESS NOTES
PRE-TRANSPLANT INFECTIOUS DISEASE CONSULT    Reason for Visit:  Pre-transplant evaluation  Referring Provider: Dr. Susanne Gimenez     History of Present Illness:    69 y.o. female with a history of HTN, DM, and CKD (Pre-HD, fistula R arm) presents for pre-kidney transplant evaluation.        Infectious History:  Recent hospital admissions: No  Recent infections: No  Recent or current antibiotic use: No  History of recurrent infections *(sinus / pneumonia / UTI / SBP)*: Yes, sinus infections, most recent sinus infection/COVID in 2024  History of diabetic foot wound or bone/joint infection: No  Recent dental infections, issues or procedures: No  History of chicken pox: Yes  History of shingles: Yes, shingles years ago  History of STI: No  History of COVID infection: Yes    History of Immunosuppression:  Prior chemotherapy / immunosuppression: No  Prior transplant: No  History of splenectomy: No    Tuberculosis:  Prior screening for latent TB: No  Prior diagnosis of latent TB: No  Risk factors for TB *known exposure, incarceration, homelessness*: No    Geographical exposures:  Currently lives in [place] with daughterJessika  Lived in the following states: [place]  Lived or travelled to the [facility] [place]: No  International travel: Yes, Cruise x3 ([place], [place])  Travel-associated illness: No    Social/Environmental:  Occupation: [place]   Pets: No   Livestock: No  Fishing / hunting: No  Hobbies: crafts  Water: bottled water  Consumption of raw/undercooked meat or seafood?  No  Tobacco: No  Alcohol: Yes, very seldom  Recreational drug use:  No  Sexual partners: 0      Past Histories:   Past Medical History:   Diagnosis Date    Anemia     Asthma     Coronary artery disease     Diabetes mellitus, type 2     Disorder of kidney and ureter     Hyperlipidemia     Hyperparathyroidism, secondary     Hypertension     Obesity     Proteinuria     Routine general medical examination at a health care facility 07/14/2017     Vitamin D deficiency      Past Surgical History:   Procedure Laterality Date    HYSTERECTOMY      LEFT HEART CATHETERIZATION  06/2024     Family History   Problem Relation Name Age of Onset    Cancer Mother      Ovarian cancer Mother      Hypertension Father      Stroke Father      Hypertension Sister      Heart disease Brother      Heart failure Brother      Diabetes Neg Hx       Social History[1]  Review of patient's allergies indicates:  No Known Allergies      Current antibiotics:  Antibiotics (From admission, onward)      None              Review of Systems  Review of Systems   Constitutional: Negative for chills, fever and night sweats.   HENT:  Negative for sore throat.    Respiratory:  Negative for cough, hemoptysis and sputum production.    Hematologic/Lymphatic: Negative for adenopathy.   Skin:  Negative for rash and suspicious lesions.   Musculoskeletal:  Negative for joint swelling.   Gastrointestinal:  Negative for diarrhea and vomiting.   Genitourinary:  Negative for dysuria.          Objective  Physical Exam  Vitals and nursing note reviewed.   Constitutional:       Appearance: Normal appearance.   HENT:      Head: Normocephalic and atraumatic.      Nose: Nose normal.      Mouth/Throat:      Mouth: Mucous membranes are moist.   Eyes:      Extraocular Movements: Extraocular movements intact.      Conjunctiva/sclera: Conjunctivae normal.   Cardiovascular:      Rate and Rhythm: Normal rate.   Pulmonary:      Effort: Pulmonary effort is normal. No respiratory distress.   Abdominal:      General: Abdomen is flat.   Musculoskeletal:         General: Normal range of motion.      Cervical back: Normal range of motion.      Right lower leg: No edema.      Left lower leg: No edema.   Skin:     General: Skin is warm and dry.      Capillary Refill: Capillary refill takes less than 2 seconds.      Findings: No lesion or rash.   Neurological:      Mental Status: She is alert and oriented to person, place, and time.  "  Psychiatric:         Mood and Affect: Mood normal.         Behavior: Behavior normal.         Labs:    CBC:   Lab Results   Component Value Date    WBC 6.02 03/19/2025    HGB 9.6 (L) 03/19/2025    HCT 30.7 (L) 03/19/2025    MCV 88 03/19/2025     03/19/2025    GRAN 3.7 03/19/2025    GRAN 61.4 03/19/2025    LYMPH 1.3 03/19/2025    LYMPH 21.9 03/19/2025    MONO 0.7 03/19/2025    MONO 12.1 03/19/2025    EOSINOPHIL 3.8 03/19/2025       Syphilis screening:   Lab Results   Component Value Date    TREPABIGMIGG Nonreactive 03/19/2025        TB screening: No results found for: "TBGOLDPLUS", "TSPOTSCREN"    HIV screening:   Lab Results   Component Value Date    [id number] Non-reactive 03/19/2025       Strongyloides IgG: No results found for: "STRONGANTIGG"    Hepatitis Serologies:   Lab Results   Component Value Date    HEPAIGG Reactive 03/19/2025    HEPBCAB Reactive (A) 03/19/2025    HEPBSAB 87.98 03/19/2025    HEPBSAB Reactive 03/19/2025    HEPCAB Non-reactive 03/19/2025        Varicella IgG: No results found for: "VARICELLAINT"      Immunization History   Administered Date(s) Administered    COVID-19, MRNA, LN-S, PF (MODERNA FULL 0.5 ML DOSE) 02/23/2021, 03/23/2021, 03/26/2021, 04/23/2021    COVID-19, MRNA, LN-S, PF (Pfizer) (Purple Cap) 12/03/2021, 03/02/2023    COVID-19, mRNA, LNP-S, bivalent booster, PF (Moderna Omicron)12 + YEARS 03/02/2023    Influenza 05/31/2020    Influenza (FLUAD) - Quadrivalent - Adjuvanted - PF *Preferred* (65+) 10/20/2023    Influenza - Quadrivalent - High Dose - PF (65 years and older) 11/15/2022    Influenza - Quadrivalent - PF *Preferred* (6 months and older) 12/14/2016, 10/29/2019, 12/09/2020    Influenza - Trivalent - Fluarix, Flulaval, Fluzone, Afluria - PF 12/14/2016    Influenza - Trivalent - Fluzone High Dose - PF (65 years and older) 09/25/2024    Influenza A (H1N1) 2009 Monovalent - IM 12/07/2009    Pneumococcal Conjugate - 20 Valent 05/25/2023    Pneumococcal Polysaccharide - " 23 Valent 09/03/2020    RSVpreF (Arexvy) 01/30/2024    Tdap 07/09/2015, 06/18/2024    Zoster 07/06/2015    Zoster Recombinant 03/02/2023, 06/18/2024        Immunization History:  Received all childhood vaccines: Yes  All household members receive annual flu vaccine: No  All household members are up to date on COVID vaccine: No      Vaccine hx:  Flu - UTD in 09/2024   COVID - UTD with series and booster, but not [id number] booster  PNA - Prevnar 20 UTD 05/2023  RSV - UTD in 01/2024  Tdap - UTD in 06/2024  Shingles -  UTD, completed series in 2024  Hep A/Hep B - unsure if vaccinated    Assessment and Plan    1. Risks of Infection: Available serologies were reviewed. No unusual risks of infection or significant barriers to transplantation were identified from the infectious disease standpoint given the information available at this time.    - Acute infectious issues: None   - Pending serologies: Quantiferon gold / T-spot, Strongyloides IgG, and VZV IgG   - Please call if any pending serologic testing is positive.    2. Immunizations:  Based on the patient's immunization history and serologies, the following immunizations are recommended:  - Hepatitis A    Patient does have immunity to hepatitis A    Vaccination ordered today: No. Reason for not ordering: Immunity demonstrated on serology   - Hepatitis B    Patient does have immunity to hepatitis B    Vaccination ordered today: No. Reason for not ordering: Immunity   - COVID    Current [facility] vaccination recommendations were discussed with the patient   - Annual high dose influenza     Vaccination ordered today: No. Reason for not ordering: Vaccination up to date   - Prevnar 20    Vaccination ordered today: No. Reason for not ordering: Vaccination up to date   - Tdap    Vaccination ordered today: No. Reason for not ordering: Vaccination up to date   - Shingrix    Vaccination ordered today: No. Reason for not ordering: Vaccination up to date    Recommended Pre-Transplant  Immunization Schedule   Vaccine  0m 1m 2m 6m   Pneumococcal conjugate vaccine (Prevnar 20) X      Tetanus-diphtheria-pertussis (Tdap)* X      Hepatitis A Vaccine (Havrix)** X   X   Hepatitis B Vaccine (Heplisav)** X X     Influenza (annual) X      Zoster Recombinant Vaccine (Shingrix) X  X           *Administer booster every 10 years.       **Administer if no immunity demonstrated on serologies               Patient will receive vaccines at local pharmacy. A written prescription was provided for all vaccine doses.     3. Counseling:   I discussed with the patient the risk for increased susceptibility to infections following transplantation including increased risk for infection right after transplant and if rejection should occur.  The patient has been counseled on the importance of vaccinations to decrease risk of infection and severe illness. Specific guidance has been provided to the patient regarding the patient's occupation, hobbies and activities to avoid future infectious complications.     4. Transplant Candidacy: Based on available information, there are no identified significant barriers to transplantation from an infectious disease standpoint.  Final determination of transplant candidacy will be made once evaluation is complete and reviewed by the Selection Committee.      Follow up with infectious disease as needed.       The total time for evaluation and management services performed on 03/19/2025 was greater than 35 minutes.                [1]   Social History  Tobacco Use    Smoking status: Never    Smokeless tobacco: Never   Substance Use Topics    Alcohol use: Yes     Comment: social    Drug use: Never

## 2025-03-25 ENCOUNTER — TELEPHONE (OUTPATIENT)
Dept: TRANSPLANT | Facility: CLINIC | Age: 70
End: 2025-03-25
Payer: COMMERCIAL

## 2025-03-25 DIAGNOSIS — N18.6 END STAGE RENAL DISEASE: Primary | ICD-10-CM

## 2025-03-25 DIAGNOSIS — Z76.82 ORGAN TRANSPLANT CANDIDATE: ICD-10-CM

## 2025-03-31 ENCOUNTER — RESULTS FOLLOW-UP (OUTPATIENT)
Dept: TRANSPLANT | Facility: HOSPITAL | Age: 70
End: 2025-03-31
Payer: COMMERCIAL

## 2025-04-07 NOTE — TELEPHONE ENCOUNTER
----- Message from Cathy Gallardo MD sent at 4/2/2025  3:31 PM CDT -----  Agree. Likely prohibitive.  ----- Message -----  From: Theron Tillman Jr., MD  Sent: 3/31/2025  10:51 PM CDT  To: Cathy Gallardo MD; [facility] Pre-Kidney Transp#    Iliac calcifications are present  Likely Prohibitive  sending to Dr. Gallardo  ----- Message -----  From: Interface, Rad Results In  Sent: 3/20/2025   6:41 AM CDT  To: Theron Tillman Jr., MD

## 2025-04-09 NOTE — TELEPHONE ENCOUNTER
----- Message from Cathy Gallardo MD sent at 4/2/2025  3:31 PM CDT -----  Agree. Likely prohibitive.  ----- Message -----  From: Theron Tillman Jr., MD  Sent: 3/31/2025  10:51 PM CDT  To: Cathy Gallardo MD; [facility] Pre-Kidney Transp#    Iliac calcifications are present  Likely Prohibitive  sending to Dr. Gallardo  ----- Message -----  From: Interface, Rad Results In  Sent: 3/20/2025   6:41 AM CDT  To: Theron Tillman Jr., MD

## 2025-05-02 ENCOUNTER — COMMITTEE REVIEW (OUTPATIENT)
Dept: TRANSPLANT | Facility: CLINIC | Age: 70
End: 2025-05-02
Payer: COMMERCIAL

## 2025-05-02 NOTE — LETTER
May 2, 2025    Aminah Dockery  [address] [facility] [place] [address]    Dear Aminahfarhana Dockery:  MRN: [id number]    It is the duty of the [facility] Selection Committee to determine which patients are candidates for a transplant. For this reason, our committee has the difficult task of evaluating patients to determine which ones have the greatest chance of having a successful transplant. We are aware of the magnitude of this responsibility, and we approach it with reverence and humility.    It is with regret I inform you that you are not approved as a transplant candidate due to  severely calcified iliac arteries.  This means that your blood vessels used to supply blood to a transplanted kidney are hardened and have developed severe blockages that render them ineffective for use in transplant. At this time there is no treatment for reversal of this condition, and unfortunately this means that a kidney transplant is no longer an option. .  Your future transplant appointments for  have been canceled.  Based on this review, we have determined that at this time, you are not a candidate for a transplant at [facility].      The selection committee carefully considers each patient's transplant candidacy and determines whether it is safe to proceed with transplantation on a case-by-case basis using established selection criteria.  At present, the risk of proceeding with an elective transplant surgery has become too high.                                                                               Although the selection committee believes you are not a suitable transplant candidate, you have the option to be evaluated at other transplant centers who may have different selection criteria.  You may request your [facility] records be sent to any center of your choice by contacting our Medical Records Department at [phone].                                                                                Attached is a letter from the [employer] Organ [employer] ([employer]).  It describes the services and information offered to patients by [employer] and the Organ Procurement and Transplant Network.    The [facility] Selection Committee sincerely wishes you the best and remains available to answer any questions.  Please do not hesitate to contact our pre-transplant office if we can assist you in any other way.                                                                               Sincerely,      Radha Zhang MD  Medical Director, Kidney & Kidney/Pancreas Transplantation     CC:  Jose R Guevara MD    Encl: [employer] Letter               The Organ Procurement and Transplantation Network   Toll-free patient services line: 7-246-317-8857  Your resource for organ transplant information      Staffed 8:30 am - 5:00 pm ET Monday - Friday   Leave a message 24/7 to receive a call back    The Organ Procurement and Transplantation Network (OPTN) is the national transplant system. It makes the policies that decide how donated organs are matched to patients waiting for a transplant. The OPTN:    Makes sure donated organs get matched to people on the transplant waiting list  Tells people about the donation and transplant processes  Makes sure that the public knows about the need for more organ and tissue donations    The OPTN has a free patient services line that you can call to:  Get more information about:   o Organ donation and organ transplants   o Donation and transplant policies  Get an information kit with:   o A list of transplant hospitals   o Waiting list information  Talk about any questions you may have about your transplant hospital or organ procurement organization. The staff will do their best to help you or point you to others who may help.  Find out how you can volunteer with the OPTN and help shape transplant policy    The patient services line number is: 5-042-400-4487    Patient services line  staff CANNOT answer questions about your own medical care, including:  Waiting list status  Test results  Medical records  You will need to call your transplant hospital for this information.    The following websites have more information about transplantation and donation:  OPTN: https://optn.transplant.hrsa.gov/  For potential living donors and transplant recipients:   o Living donation process: https://optn.transplant.hrsa.gov/living-donation/     o Financial assistance: https://www.livingdonorassistance.org/  Transplantation data: https://www.srtr.org/  Organ donation: https://www.organdonor.gov/    Volunteer with the OPTN: https://optn.transplant.hrsa.gov/get-involved

## 2025-05-02 NOTE — COMMITTEE REVIEW
Native Organ Dx: Diabetes Mellitus - Type II    Discussed in transplant selection committee meeting this morning.    Not approved for LRD/CAD transplant due to severe iliac calcifications, transplant is prohibitive.    Spoke to pt, notified her of the committee decision, she verbalized understanding and stated no questions at present time.    Note written by Pepper Sutherland RN      ===============================================    I was present at the meeting and attest to the general consensus of the committee.   Sean Jo Jr.

## 2025-07-23 ENCOUNTER — TELEPHONE (OUTPATIENT)
Dept: PULMONOLOGY | Facility: CLINIC | Age: 70
End: 2025-07-23
Payer: COMMERCIAL

## 2025-07-23 DIAGNOSIS — J45.50 SEVERE PERSISTENT ASTHMA WITHOUT COMPLICATION: Primary | ICD-10-CM

## 2025-07-31 ENCOUNTER — HOSPITAL ENCOUNTER (OUTPATIENT)
Dept: RADIOLOGY | Facility: HOSPITAL | Age: 70
Discharge: HOME | End: 2025-07-31
Payer: COMMERCIAL

## 2025-07-31 ENCOUNTER — CLINICAL SUPPORT (OUTPATIENT)
Dept: PULMONOLOGY | Facility: CLINIC | Age: 70
End: 2025-07-31
Payer: COMMERCIAL

## 2025-07-31 ENCOUNTER — OFFICE VISIT (OUTPATIENT)
Dept: PULMONOLOGY | Facility: CLINIC | Age: 70
End: 2025-07-31
Payer: COMMERCIAL

## 2025-07-31 VITALS
DIASTOLIC BLOOD PRESSURE: 70 MMHG | OXYGEN SATURATION: 97 % | SYSTOLIC BLOOD PRESSURE: 112 MMHG | HEART RATE: 60 BPM | BODY MASS INDEX: 35.34 KG/M2 | RESPIRATION RATE: 18 BRPM | HEIGHT: 60 IN | WEIGHT: 180 LBS

## 2025-07-31 DIAGNOSIS — J45.50 SEVERE PERSISTENT ASTHMA WITHOUT COMPLICATION: ICD-10-CM

## 2025-07-31 DIAGNOSIS — E66.811 CLASS 1 OBESITY DUE TO EXCESS CALORIES WITH SERIOUS COMORBIDITY AND BODY MASS INDEX (BMI) OF 34.0 TO 34.9 IN ADULT: ICD-10-CM

## 2025-07-31 DIAGNOSIS — J45.50 SEVERE PERSISTENT ASTHMA WITHOUT COMPLICATION: Primary | ICD-10-CM

## 2025-07-31 DIAGNOSIS — E66.09 CLASS 1 OBESITY DUE TO EXCESS CALORIES WITH SERIOUS COMORBIDITY AND BODY MASS INDEX (BMI) OF 34.0 TO 34.9 IN ADULT: ICD-10-CM

## 2025-07-31 PROCEDURE — 99999 PR PBB SHADOW E&M-EST. PATIENT-LVL V: CPT | Mod: PBBFAC,,,

## 2025-07-31 PROCEDURE — 99999 PR PBB SHADOW E&M-EST. PATIENT-LVL I: CPT | Mod: PBBFAC,,,

## 2025-07-31 PROCEDURE — 71046 X-RAY EXAM CHEST 2 VIEWS: CPT | Mod: TC

## 2025-07-31 PROCEDURE — 99214 OFFICE O/P EST MOD 30 MIN: CPT | Mod: 25,S$GLB,,

## 2025-07-31 PROCEDURE — 71046 X-RAY EXAM CHEST 2 VIEWS: CPT | Mod: 26,,, | Performed by: STUDENT IN AN ORGANIZED HEALTH CARE EDUCATION/TRAINING PROGRAM

## 2025-07-31 RX ORDER — PANTOPRAZOLE SODIUM 40 MG/1
TABLET, DELAYED RELEASE ORAL
COMMUNITY
Start: 2025-07-21

## 2025-07-31 RX ORDER — PROMETHAZINE HYDROCHLORIDE AND DEXTROMETHORPHAN HYDROBROMIDE 6.25; 15 MG/5ML; MG/5ML
SYRUP ORAL
COMMUNITY
Start: 2025-05-21

## 2025-07-31 RX ORDER — SODIUM ZIRCONIUM CYCLOSILICATE 10 G/10G
10 POWDER, FOR SUSPENSION ORAL
COMMUNITY
Start: 2025-07-10

## 2025-07-31 NOTE — PROCEDURES
Clinical Guide to Interpretation or FeNO Levels :    FeNO  (ppb) LOW INTERMEDIATE HIGH   ADULT VALUES < 25 25-50          > 50   Th2-driven Inflammation Unlikely Likely Significant     Patients FeNO level at this visit : _17___ (ppb)    Interpretation of FeNO measurement in adults:  [x] FENO is less than 25 ppb implies non eosinophilic airway inflammation or the absence of airway inflammation.    Comment: Low FENO (<25 ppb) in adult asthmatics with persistent symptoms suggests other etiologies for these symptoms and a lower likelihood of benefit from adding or increasing inhaled glucocorticoids.    Discussion:  A FENO less than 25 ppb in adults and less than 20 ppb in children younger than 12 years of age implies noneosinophilic airway inflammation or the absence of airway inflammation.  A FENO greater than 50 ppb in adults or greater than 35 ppb in children suggests eosinophilic airway inflammation.   Values of FENO between 25 and 50 ppb in adults (20 to 35 ppb in children) should be interpreted cautiously with reference to the clinical situation (eg, symptomatic, on or off therapy, current smoking).  A rising FENO with a greater than 20 percent change and more than 25 ppb (20 ppb in children) from a previously stable level suggests increasing eosinophilic airway inflammation, but there are wide inter-individual differences.  A decrease in FENO greater than 20 percent for values over 50 ppb or more than 10 ppb for values less than 50 ppb may be clinically important.  ?FENO in other respiratory diseases - Several other diseases are associated with altered levels of exhaled NO: low levels of FENO have been noted in cystic fibrosis, current smoking, pulmonary hypertension, hypothermia, primary ciliary dyskinesia, and bronchopulmonary dysplasia. Elevated FENO has been noted in atopy, nonasthmatic eosinophilic bronchitis, COPD exacerbations, noncystic fibrosis bronchiectasis, and viral upper respiratory  infections.    REFERENCE:  ATS Board of Directors, December 2004, and by the ERS Executive Committee, June 2004. ATS/ERS Recommendations for Standardized Procedures for the Online and Offline Measurement of Exhaled Lower Respiratory Nitric Oxide and Nasal Nitric Oxide. Guideline 2005

## 2025-08-01 LAB
BRPFT: ABNORMAL
DLCO ADJ PRE: 11.55 ML/(MIN*MMHG) (ref 13.21–24.68)
DLCO SINGLE BREATH LLN: 13.21
DLCO SINGLE BREATH PRE REF: 52.4 %
DLCO SINGLE BREATH REF: 18.95
DLCOC SBVA LLN: 2.75
DLCOC SBVA PRE REF: 96.4 %
DLCOC SBVA REF: 4.44
DLCOC SINGLE BREATH LLN: 13.21
DLCOC SINGLE BREATH PRE REF: 61 %
DLCOC SINGLE BREATH REF: 18.95
DLCOVA LLN: 2.75
DLCOVA PRE REF: 82.9 %
DLCOVA PRE: 3.68 ML/(MIN*MMHG*L) (ref 2.75–6.13)
DLCOVA REF: 4.44
DLVAADJ PRE: 4.28 ML/(MIN*MMHG*L) (ref 2.75–6.13)
FEF 25 75 CHG: 20.7 %
FEF 25 75 LLN: 0.59
FEF 25 75 POST REF: 50.6 %
FEF 25 75 PRE REF: 41.9 %
FEF 25 75 REF: 1.53
FET100 CHG: -29.3 %
FEV1 CHG: -2.9 %
FEV1 FVC CHG: 6.2 %
FEV1 FVC LLN: 66
FEV1 FVC POST REF: 97.8 %
FEV1 FVC PRE REF: 92.1 %
FEV1 FVC REF: 79
FEV1 LLN: 1.18
FEV1 POST REF: 57.3 %
FEV1 PRE REF: 59 %
FEV1 REF: 1.68
FVC CHG: -8.6 %
FVC LLN: 1.51
FVC POST REF: 58.4 %
FVC PRE REF: 63.8 %
FVC REF: 2.12
IVC PRE: 1.41 L (ref 1.51–2.77)
IVC SINGLE BREATH LLN: 1.51
IVC SINGLE BREATH PRE REF: 66.5 %
IVC SINGLE BREATH REF: 2.12
PEF CHG: 9.2 %
PEF LLN: 2.54
PEF POST REF: 96.1 %
PEF PRE REF: 88 %
PEF REF: 4.3
POST FEF 25 75: 0.78 L/S (ref 0.59–2.97)
POST FET 100: 4.86 SEC
POST FEV1 FVC: 77.45 % (ref 66.3–90.34)
POST FEV1: 0.96 L (ref 1.18–2.16)
POST FVC: 1.24 L (ref 1.51–2.77)
POST PEF: 4.13 L/S (ref 2.54–6.06)
PRE DLCO: 9.93 ML/(MIN*MMHG) (ref 13.21–24.68)
PRE FEF 25 75: 0.64 L/S (ref 0.59–2.97)
PRE FET 100: 6.87 SEC
PRE FEV1 FVC: 72.93 % (ref 66.3–90.34)
PRE FEV1: 0.99 L (ref 1.18–2.16)
PRE FVC: 1.36 L (ref 1.51–2.77)
PRE PEF: 3.78 L/S (ref 2.54–6.06)
VA PRE: 2.7 L (ref 4.12–4.12)
VA SINGLE BREATH LLN: 4.12
VA SINGLE BREATH PRE REF: 65.5 %
VA SINGLE BREATH REF: 4.12